# Patient Record
Sex: FEMALE | Race: WHITE | Employment: PART TIME | URBAN - METROPOLITAN AREA
[De-identification: names, ages, dates, MRNs, and addresses within clinical notes are randomized per-mention and may not be internally consistent; named-entity substitution may affect disease eponyms.]

---

## 2022-04-02 ENCOUNTER — APPOINTMENT (OUTPATIENT)
Dept: CT IMAGING | Age: 57
End: 2022-04-02
Payer: MEDICAID

## 2022-04-02 ENCOUNTER — HOSPITAL ENCOUNTER (OUTPATIENT)
Age: 57
Setting detail: OBSERVATION
Discharge: HOME HEALTH | End: 2022-04-03
Attending: EMERGENCY MEDICINE | Admitting: INTERNAL MEDICINE
Payer: MEDICAID

## 2022-04-02 ENCOUNTER — APPOINTMENT (OUTPATIENT)
Dept: GENERAL RADIOLOGY | Age: 57
End: 2022-04-02
Payer: MEDICAID

## 2022-04-02 DIAGNOSIS — Z79.4 TYPE 2 DIABETES MELLITUS WITHOUT COMPLICATION, WITH LONG-TERM CURRENT USE OF INSULIN (HCC): Primary | ICD-10-CM

## 2022-04-02 DIAGNOSIS — R51.9 NONINTRACTABLE HEADACHE, UNSPECIFIED CHRONICITY PATTERN, UNSPECIFIED HEADACHE TYPE: ICD-10-CM

## 2022-04-02 DIAGNOSIS — E16.2 HYPOGLYCEMIA: ICD-10-CM

## 2022-04-02 DIAGNOSIS — E11.9 TYPE 2 DIABETES MELLITUS WITHOUT COMPLICATION, WITH LONG-TERM CURRENT USE OF INSULIN (HCC): Primary | ICD-10-CM

## 2022-04-02 DIAGNOSIS — Z79.899 POLYPHARMACY: ICD-10-CM

## 2022-04-02 DIAGNOSIS — H53.8 BLURRY VISION, BILATERAL: ICD-10-CM

## 2022-04-02 PROBLEM — F33.41 RECURRENT MAJOR DEPRESSIVE DISORDER, IN PARTIAL REMISSION (HCC): Status: ACTIVE | Noted: 2021-07-12

## 2022-04-02 PROBLEM — E16.1 HYPOGLYCEMIA, EXOGENOUS HYPERINSULINEMIA: Status: ACTIVE | Noted: 2022-04-02

## 2022-04-02 PROBLEM — G47.00 INSOMNIA: Status: ACTIVE | Noted: 2021-07-12

## 2022-04-02 PROBLEM — K44.9 HIATAL HERNIA WITH GERD: Status: ACTIVE | Noted: 2019-07-14

## 2022-04-02 PROBLEM — E78.5 HYPERLIPIDEMIA LDL GOAL <70: Status: ACTIVE | Noted: 2021-07-12

## 2022-04-02 PROBLEM — K21.9 HIATAL HERNIA WITH GERD: Status: ACTIVE | Noted: 2019-07-14

## 2022-04-02 PROBLEM — I10 ESSENTIAL (PRIMARY) HYPERTENSION: Status: ACTIVE | Noted: 2021-04-23

## 2022-04-02 PROBLEM — E11.40 DIABETIC NEUROPATHY ASSOCIATED WITH TYPE 2 DIABETES MELLITUS (HCC): Status: ACTIVE | Noted: 2021-07-12

## 2022-04-02 LAB
ALBUMIN SERPL-MCNC: 4.2 G/DL (ref 3.5–5.2)
ALP BLD-CCNC: 84 U/L (ref 35–104)
ALT SERPL-CCNC: 16 U/L (ref 0–32)
ANION GAP SERPL CALCULATED.3IONS-SCNC: 10 MMOL/L (ref 7–16)
AST SERPL-CCNC: 12 U/L (ref 0–31)
BASOPHILS ABSOLUTE: 0.03 E9/L (ref 0–0.2)
BASOPHILS RELATIVE PERCENT: 0.5 % (ref 0–2)
BILIRUB SERPL-MCNC: 0.2 MG/DL (ref 0–1.2)
BILIRUBIN URINE: NEGATIVE
BLOOD, URINE: NEGATIVE
BUN BLDV-MCNC: 16 MG/DL (ref 6–20)
CALCIUM SERPL-MCNC: 9.5 MG/DL (ref 8.6–10.2)
CHLORIDE BLD-SCNC: 106 MMOL/L (ref 98–107)
CLARITY: CLEAR
CO2: 26 MMOL/L (ref 22–29)
COLOR: YELLOW
CREAT SERPL-MCNC: 0.6 MG/DL (ref 0.5–1)
D DIMER: 422 NG/ML DDU
EKG ATRIAL RATE: 65 BPM
EKG P AXIS: 40 DEGREES
EKG P-R INTERVAL: 124 MS
EKG Q-T INTERVAL: 386 MS
EKG QRS DURATION: 90 MS
EKG QTC CALCULATION (BAZETT): 401 MS
EKG R AXIS: 20 DEGREES
EKG T AXIS: 9 DEGREES
EKG VENTRICULAR RATE: 65 BPM
EOSINOPHILS ABSOLUTE: 0.07 E9/L (ref 0.05–0.5)
EOSINOPHILS RELATIVE PERCENT: 1.2 % (ref 0–6)
GFR AFRICAN AMERICAN: >60
GFR NON-AFRICAN AMERICAN: >60 ML/MIN/1.73
GLUCOSE BLD-MCNC: 102 MG/DL (ref 74–99)
GLUCOSE URINE: NEGATIVE MG/DL
HBA1C MFR BLD: 5.9 % (ref 4–5.6)
HCT VFR BLD CALC: 38.8 % (ref 34–48)
HEMOGLOBIN: 13.2 G/DL (ref 11.5–15.5)
IMMATURE GRANULOCYTES #: 0.02 E9/L
IMMATURE GRANULOCYTES %: 0.4 % (ref 0–5)
INR BLD: 1.1
KETONES, URINE: NEGATIVE MG/DL
LACTIC ACID: 0.7 MMOL/L (ref 0.5–2.2)
LEUKOCYTE ESTERASE, URINE: NEGATIVE
LIPASE: 41 U/L (ref 13–60)
LYMPHOCYTES ABSOLUTE: 1.98 E9/L (ref 1.5–4)
LYMPHOCYTES RELATIVE PERCENT: 35.1 % (ref 20–42)
MCH RBC QN AUTO: 31.8 PG (ref 26–35)
MCHC RBC AUTO-ENTMCNC: 34 % (ref 32–34.5)
MCV RBC AUTO: 93.5 FL (ref 80–99.9)
METER GLUCOSE: 118 MG/DL (ref 74–99)
METER GLUCOSE: 128 MG/DL (ref 74–99)
METER GLUCOSE: 196 MG/DL (ref 74–99)
METER GLUCOSE: 247 MG/DL (ref 74–99)
METER GLUCOSE: 49 MG/DL (ref 74–99)
METER GLUCOSE: 78 MG/DL (ref 74–99)
MONOCYTES ABSOLUTE: 0.35 E9/L (ref 0.1–0.95)
MONOCYTES RELATIVE PERCENT: 6.2 % (ref 2–12)
NEUTROPHILS ABSOLUTE: 3.19 E9/L (ref 1.8–7.3)
NEUTROPHILS RELATIVE PERCENT: 56.6 % (ref 43–80)
NITRITE, URINE: NEGATIVE
PDW BLD-RTO: 12.4 FL (ref 11.5–15)
PH UA: 5.5 (ref 5–9)
PLATELET # BLD: 201 E9/L (ref 130–450)
PMV BLD AUTO: 11.1 FL (ref 7–12)
POTASSIUM REFLEX MAGNESIUM: 3.9 MMOL/L (ref 3.5–5)
PROTEIN UA: NEGATIVE MG/DL
PROTHROMBIN TIME: 11.9 SEC (ref 9.3–12.4)
RBC # BLD: 4.15 E12/L (ref 3.5–5.5)
SEDIMENTATION RATE, ERYTHROCYTE: 23 MM/HR (ref 0–20)
SODIUM BLD-SCNC: 142 MMOL/L (ref 132–146)
SPECIFIC GRAVITY UA: 1.01 (ref 1–1.03)
TOTAL PROTEIN: 6.9 G/DL (ref 6.4–8.3)
TROPONIN, HIGH SENSITIVITY: 8 NG/L (ref 0–9)
TSH SERPL DL<=0.05 MIU/L-ACNC: 0.7 UIU/ML (ref 0.27–4.2)
UROBILINOGEN, URINE: 0.2 E.U./DL
WBC # BLD: 5.6 E9/L (ref 4.5–11.5)

## 2022-04-02 PROCEDURE — 93005 ELECTROCARDIOGRAM TRACING: CPT | Performed by: EMERGENCY MEDICINE

## 2022-04-02 PROCEDURE — 99285 EMERGENCY DEPT VISIT HI MDM: CPT

## 2022-04-02 PROCEDURE — 85025 COMPLETE CBC W/AUTO DIFF WBC: CPT

## 2022-04-02 PROCEDURE — 83036 HEMOGLOBIN GLYCOSYLATED A1C: CPT

## 2022-04-02 PROCEDURE — 85610 PROTHROMBIN TIME: CPT

## 2022-04-02 PROCEDURE — 82962 GLUCOSE BLOOD TEST: CPT

## 2022-04-02 PROCEDURE — 71275 CT ANGIOGRAPHY CHEST: CPT

## 2022-04-02 PROCEDURE — 85651 RBC SED RATE NONAUTOMATED: CPT

## 2022-04-02 PROCEDURE — 6370000000 HC RX 637 (ALT 250 FOR IP): Performed by: FAMILY MEDICINE

## 2022-04-02 PROCEDURE — 96372 THER/PROPH/DIAG INJ SC/IM: CPT

## 2022-04-02 PROCEDURE — 83605 ASSAY OF LACTIC ACID: CPT

## 2022-04-02 PROCEDURE — G0378 HOSPITAL OBSERVATION PER HR: HCPCS

## 2022-04-02 PROCEDURE — 81003 URINALYSIS AUTO W/O SCOPE: CPT

## 2022-04-02 PROCEDURE — 84443 ASSAY THYROID STIM HORMONE: CPT

## 2022-04-02 PROCEDURE — 6360000002 HC RX W HCPCS: Performed by: FAMILY MEDICINE

## 2022-04-02 PROCEDURE — 84484 ASSAY OF TROPONIN QUANT: CPT

## 2022-04-02 PROCEDURE — 85378 FIBRIN DEGRADE SEMIQUANT: CPT

## 2022-04-02 PROCEDURE — 6360000004 HC RX CONTRAST MEDICATION: Performed by: RADIOLOGY

## 2022-04-02 PROCEDURE — 83690 ASSAY OF LIPASE: CPT

## 2022-04-02 PROCEDURE — 71045 X-RAY EXAM CHEST 1 VIEW: CPT

## 2022-04-02 PROCEDURE — 2580000003 HC RX 258: Performed by: FAMILY MEDICINE

## 2022-04-02 PROCEDURE — 70450 CT HEAD/BRAIN W/O DYE: CPT

## 2022-04-02 PROCEDURE — 99219 PR INITIAL OBSERVATION CARE/DAY 50 MINUTES: CPT | Performed by: FAMILY MEDICINE

## 2022-04-02 PROCEDURE — 80053 COMPREHEN METABOLIC PANEL: CPT

## 2022-04-02 PROCEDURE — 93010 ELECTROCARDIOGRAM REPORT: CPT | Performed by: INTERNAL MEDICINE

## 2022-04-02 RX ORDER — GABAPENTIN 300 MG/1
300 CAPSULE ORAL 3 TIMES DAILY
Status: DISCONTINUED | OUTPATIENT
Start: 2022-04-02 | End: 2022-04-03 | Stop reason: HOSPADM

## 2022-04-02 RX ORDER — ATORVASTATIN CALCIUM 20 MG/1
20 TABLET, FILM COATED ORAL DAILY
COMMUNITY

## 2022-04-02 RX ORDER — BLOOD SUGAR DIAGNOSTIC
STRIP MISCELLANEOUS
COMMUNITY
End: 2022-04-02 | Stop reason: SDUPTHER

## 2022-04-02 RX ORDER — CHLORAL HYDRATE 500 MG
1000 CAPSULE ORAL 2 TIMES DAILY
COMMUNITY

## 2022-04-02 RX ORDER — GABAPENTIN 300 MG/1
300 CAPSULE ORAL 3 TIMES DAILY
COMMUNITY
End: 2022-05-12 | Stop reason: SDUPTHER

## 2022-04-02 RX ORDER — LAMOTRIGINE 150 MG/1
150 TABLET ORAL DAILY
COMMUNITY
End: 2022-05-12 | Stop reason: SDUPTHER

## 2022-04-02 RX ORDER — ZOLPIDEM TARTRATE 10 MG/1
10 TABLET ORAL EVERY EVENING
COMMUNITY

## 2022-04-02 RX ORDER — ONDANSETRON 4 MG/1
4 TABLET, ORALLY DISINTEGRATING ORAL EVERY 8 HOURS PRN
Status: DISCONTINUED | OUTPATIENT
Start: 2022-04-02 | End: 2022-04-03 | Stop reason: HOSPADM

## 2022-04-02 RX ORDER — SODIUM CHLORIDE 0.9 % (FLUSH) 0.9 %
5-40 SYRINGE (ML) INJECTION EVERY 12 HOURS SCHEDULED
Status: DISCONTINUED | OUTPATIENT
Start: 2022-04-02 | End: 2022-04-03 | Stop reason: HOSPADM

## 2022-04-02 RX ORDER — BENZONATATE 100 MG/1
100 CAPSULE ORAL 3 TIMES DAILY PRN
COMMUNITY

## 2022-04-02 RX ORDER — DEXTROSE AND SODIUM CHLORIDE 5; .45 G/100ML; G/100ML
INJECTION, SOLUTION INTRAVENOUS CONTINUOUS
Status: DISCONTINUED | OUTPATIENT
Start: 2022-04-02 | End: 2022-04-03

## 2022-04-02 RX ORDER — INSULIN ASPART 100 [IU]/ML
20 INJECTION, SUSPENSION SUBCUTANEOUS EVERY MORNING
COMMUNITY
End: 2022-04-02

## 2022-04-02 RX ORDER — SODIUM CHLORIDE 0.9 % (FLUSH) 0.9 %
5-40 SYRINGE (ML) INJECTION PRN
Status: DISCONTINUED | OUTPATIENT
Start: 2022-04-02 | End: 2022-04-03 | Stop reason: HOSPADM

## 2022-04-02 RX ORDER — DEXTROSE MONOHYDRATE 50 MG/ML
100 INJECTION, SOLUTION INTRAVENOUS PRN
Status: DISCONTINUED | OUTPATIENT
Start: 2022-04-02 | End: 2022-04-03 | Stop reason: HOSPADM

## 2022-04-02 RX ORDER — IBUPROFEN 800 MG/1
800 TABLET ORAL EVERY 8 HOURS PRN
COMMUNITY

## 2022-04-02 RX ORDER — ACETAMINOPHEN 325 MG/1
650 TABLET ORAL EVERY 6 HOURS PRN
Status: DISCONTINUED | OUTPATIENT
Start: 2022-04-02 | End: 2022-04-03 | Stop reason: HOSPADM

## 2022-04-02 RX ORDER — INSULIN ASPART 100 [IU]/ML
15 INJECTION, SUSPENSION SUBCUTANEOUS
COMMUNITY
End: 2022-04-02

## 2022-04-02 RX ORDER — IBUPROFEN 600 MG/1
1 TABLET ORAL PRN
Qty: 1 KIT | Refills: 2 | Status: SHIPPED | OUTPATIENT
Start: 2022-04-02

## 2022-04-02 RX ORDER — FLUTICASONE PROPIONATE 50 MCG
1 SPRAY, SUSPENSION (ML) NASAL DAILY
COMMUNITY

## 2022-04-02 RX ORDER — BENZONATATE 100 MG/1
100 CAPSULE ORAL 3 TIMES DAILY PRN
Status: DISCONTINUED | OUTPATIENT
Start: 2022-04-02 | End: 2022-04-03 | Stop reason: HOSPADM

## 2022-04-02 RX ORDER — ESCITALOPRAM OXALATE 10 MG/1
10 TABLET ORAL DAILY
Status: DISCONTINUED | OUTPATIENT
Start: 2022-04-02 | End: 2022-04-03 | Stop reason: HOSPADM

## 2022-04-02 RX ORDER — POLYETHYLENE GLYCOL 3350 17 G/17G
17 POWDER, FOR SOLUTION ORAL DAILY PRN
Status: DISCONTINUED | OUTPATIENT
Start: 2022-04-02 | End: 2022-04-03 | Stop reason: HOSPADM

## 2022-04-02 RX ORDER — LISINOPRIL 5 MG/1
5 TABLET ORAL DAILY
Status: DISCONTINUED | OUTPATIENT
Start: 2022-04-02 | End: 2022-04-03 | Stop reason: HOSPADM

## 2022-04-02 RX ORDER — BLOOD SUGAR DIAGNOSTIC
STRIP MISCELLANEOUS
Qty: 120 EACH | Refills: 0 | Status: SHIPPED | OUTPATIENT
Start: 2022-04-02

## 2022-04-02 RX ORDER — LISINOPRIL 5 MG/1
5 TABLET ORAL DAILY
COMMUNITY

## 2022-04-02 RX ORDER — INSULIN ASPART 100 [IU]/ML
4 INJECTION, SOLUTION INTRAVENOUS; SUBCUTANEOUS
Qty: 5 PEN | Refills: 3 | Status: SHIPPED | OUTPATIENT
Start: 2022-04-02 | End: 2022-05-12 | Stop reason: SDUPTHER

## 2022-04-02 RX ORDER — SODIUM CHLORIDE 9 MG/ML
INJECTION, SOLUTION INTRAVENOUS PRN
Status: DISCONTINUED | OUTPATIENT
Start: 2022-04-02 | End: 2022-04-03 | Stop reason: HOSPADM

## 2022-04-02 RX ORDER — ACETAMINOPHEN 650 MG/1
650 SUPPOSITORY RECTAL EVERY 6 HOURS PRN
Status: DISCONTINUED | OUTPATIENT
Start: 2022-04-02 | End: 2022-04-03 | Stop reason: HOSPADM

## 2022-04-02 RX ORDER — NICOTINE POLACRILEX 4 MG
15 LOZENGE BUCCAL PRN
Status: DISCONTINUED | OUTPATIENT
Start: 2022-04-02 | End: 2022-04-03 | Stop reason: HOSPADM

## 2022-04-02 RX ORDER — AMLODIPINE BESYLATE 10 MG/1
10 TABLET ORAL DAILY
COMMUNITY
End: 2022-05-12 | Stop reason: SDUPTHER

## 2022-04-02 RX ORDER — PANTOPRAZOLE SODIUM 40 MG/1
40 TABLET, DELAYED RELEASE ORAL DAILY
Status: DISCONTINUED | OUTPATIENT
Start: 2022-04-02 | End: 2022-04-03 | Stop reason: HOSPADM

## 2022-04-02 RX ORDER — AMLODIPINE BESYLATE 10 MG/1
10 TABLET ORAL DAILY
Status: DISCONTINUED | OUTPATIENT
Start: 2022-04-02 | End: 2022-04-03 | Stop reason: HOSPADM

## 2022-04-02 RX ORDER — ESCITALOPRAM OXALATE 10 MG/1
10 TABLET ORAL DAILY
COMMUNITY
End: 2022-05-12 | Stop reason: SDUPTHER

## 2022-04-02 RX ORDER — CLONAZEPAM 0.5 MG/1
1 TABLET ORAL 2 TIMES DAILY
Status: DISCONTINUED | OUTPATIENT
Start: 2022-04-02 | End: 2022-04-03 | Stop reason: HOSPADM

## 2022-04-02 RX ORDER — PANTOPRAZOLE SODIUM 40 MG/1
40 TABLET, DELAYED RELEASE ORAL DAILY
COMMUNITY

## 2022-04-02 RX ORDER — ATORVASTATIN CALCIUM 20 MG/1
20 TABLET, FILM COATED ORAL DAILY
Status: DISCONTINUED | OUTPATIENT
Start: 2022-04-02 | End: 2022-04-03 | Stop reason: HOSPADM

## 2022-04-02 RX ORDER — INSULIN GLARGINE 100 [IU]/ML
50 INJECTION, SOLUTION SUBCUTANEOUS NIGHTLY
Qty: 5 PEN | Refills: 0 | Status: SHIPPED | OUTPATIENT
Start: 2022-04-02 | End: 2022-04-02 | Stop reason: SDUPTHER

## 2022-04-02 RX ORDER — ONDANSETRON 2 MG/ML
4 INJECTION INTRAMUSCULAR; INTRAVENOUS EVERY 6 HOURS PRN
Status: DISCONTINUED | OUTPATIENT
Start: 2022-04-02 | End: 2022-04-03 | Stop reason: HOSPADM

## 2022-04-02 RX ORDER — CYCLOBENZAPRINE HCL 10 MG
10 TABLET ORAL NIGHTLY PRN
COMMUNITY

## 2022-04-02 RX ORDER — BLOOD-GLUCOSE METER
KIT MISCELLANEOUS
Qty: 1 KIT | Refills: 0 | Status: SHIPPED | OUTPATIENT
Start: 2022-04-02

## 2022-04-02 RX ORDER — INSULIN GLARGINE 100 [IU]/ML
40 INJECTION, SOLUTION SUBCUTANEOUS 2 TIMES DAILY
COMMUNITY
End: 2022-04-02 | Stop reason: SDUPTHER

## 2022-04-02 RX ORDER — LAMOTRIGINE 100 MG/1
150 TABLET ORAL DAILY
Status: DISCONTINUED | OUTPATIENT
Start: 2022-04-02 | End: 2022-04-03 | Stop reason: HOSPADM

## 2022-04-02 RX ORDER — ZOLPIDEM TARTRATE 5 MG/1
10 TABLET ORAL EVERY EVENING
Status: DISCONTINUED | OUTPATIENT
Start: 2022-04-02 | End: 2022-04-03 | Stop reason: HOSPADM

## 2022-04-02 RX ORDER — DEXTROSE MONOHYDRATE 25 G/50ML
12.5 INJECTION, SOLUTION INTRAVENOUS PRN
Status: DISCONTINUED | OUTPATIENT
Start: 2022-04-02 | End: 2022-04-03 | Stop reason: HOSPADM

## 2022-04-02 RX ORDER — CLONAZEPAM 1 MG/1
1 TABLET ORAL 2 TIMES DAILY
COMMUNITY
End: 2022-05-12 | Stop reason: SDUPTHER

## 2022-04-02 RX ORDER — INSULIN GLARGINE 100 [IU]/ML
40 INJECTION, SOLUTION SUBCUTANEOUS NIGHTLY
Qty: 5 PEN | Refills: 0 | Status: SHIPPED | OUTPATIENT
Start: 2022-04-02 | End: 2022-05-12 | Stop reason: SDUPTHER

## 2022-04-02 RX ADMIN — CLONAZEPAM 1 MG: 0.5 TABLET ORAL at 19:11

## 2022-04-02 RX ADMIN — AMLODIPINE BESYLATE 10 MG: 10 TABLET ORAL at 19:11

## 2022-04-02 RX ADMIN — LAMOTRIGINE 150 MG: 100 TABLET ORAL at 21:36

## 2022-04-02 RX ADMIN — ATORVASTATIN CALCIUM 20 MG: 20 TABLET, FILM COATED ORAL at 19:11

## 2022-04-02 RX ADMIN — INSULIN LISPRO 2 UNITS: 100 INJECTION, SOLUTION INTRAVENOUS; SUBCUTANEOUS at 21:44

## 2022-04-02 RX ADMIN — GABAPENTIN 300 MG: 300 CAPSULE ORAL at 21:35

## 2022-04-02 RX ADMIN — PANTOPRAZOLE SODIUM 40 MG: 40 TABLET, DELAYED RELEASE ORAL at 19:11

## 2022-04-02 RX ADMIN — DEXTROSE AND SODIUM CHLORIDE: 5; 450 INJECTION, SOLUTION INTRAVENOUS at 20:34

## 2022-04-02 RX ADMIN — IOPAMIDOL 75 ML: 755 INJECTION, SOLUTION INTRAVENOUS at 14:41

## 2022-04-02 RX ADMIN — ESCITALOPRAM 10 MG: 10 TABLET, FILM COATED ORAL at 19:11

## 2022-04-02 RX ADMIN — ENOXAPARIN SODIUM 40 MG: 40 INJECTION SUBCUTANEOUS at 19:12

## 2022-04-02 RX ADMIN — LURASIDONE HYDROCHLORIDE 40 MG: 20 TABLET, FILM COATED ORAL at 21:35

## 2022-04-02 RX ADMIN — LISINOPRIL 5 MG: 5 TABLET ORAL at 19:11

## 2022-04-02 ASSESSMENT — ENCOUNTER SYMPTOMS
EYE PAIN: 1
RHINORRHEA: 0
COLOR CHANGE: 0
PHOTOPHOBIA: 0
VOMITING: 0
COUGH: 1
ABDOMINAL PAIN: 0
SHORTNESS OF BREATH: 1
NAUSEA: 1

## 2022-04-02 ASSESSMENT — PAIN SCALES - GENERAL: PAINLEVEL_OUTOF10: 0

## 2022-04-02 ASSESSMENT — VISUAL ACUITY
OD: 20/50
OS: 20/40

## 2022-04-02 NOTE — ED NOTES
Pt states that she has been having trouble with her bgm's running from the , she states that is causes her to have dizziness, blurred vision and feeling of fatigue, she states that she also has been having weakness, pt is alerttimes 3, resp easy, states that on exertion she does get short of breath, abdomen is soft with bowel sounds times 4, 0 edema to lower legs bilateral, pedal pulses plus 2 bilateral.      Swathi Steen, RN  04/02/22 [id number]

## 2022-04-02 NOTE — ED PROVIDER NOTES
Dotty Armstrong is a 61 y.o. female presenting to the ED for headaches, sob, dizzyness, weakness, cough, beginning 1 month ago. The complaint has been intermittent, moderate in severity, and worsened by nothing. 60 yo f who has history of DM2 on insulin, anxiety, panic attacks, htn, hyperlipidemia  notes right eyeball pressure, headaches, forehead headache intermittent sob, cough, and dizzyness. Pt notes she is on lantus 40 qam, and she is on insulin 70/30 20 u in am and 15 q pm and also takes waws puit on lantus 30 qhs but states she is having lows in the morning, she notes she is \"doing a sliding scale of lantus\". Pt notes she was seeing a nurse practicioner in [place] who admitted she didn't know what she was doing,. Pt notes in am blood sugar is 40-90./ pt notes is usually 300 after meals. Pt is also on lisinipril, amlodipine, lipitor and klonopin and ambien and protonix    Review of Systems:   Review of Systems   Constitutional: Negative for diaphoresis and fatigue. HENT: Negative for congestion and rhinorrhea. Eyes: Positive for pain. Negative for photophobia and visual disturbance. Respiratory: Positive for cough and shortness of breath. Cardiovascular: Negative for chest pain and leg swelling. Gastrointestinal: Positive for nausea. Negative for abdominal pain and vomiting. Endocrine: Negative for polyphagia and polyuria. Genitourinary: Negative for difficulty urinating and dyspareunia. Musculoskeletal: Negative for arthralgias and joint swelling. Skin: Negative for color change and rash. Allergic/Immunologic: Negative for food allergies and immunocompromised state. Neurological: Positive for headaches. Negative for seizures and numbness. Hematological: Negative for adenopathy. Does not bruise/bleed easily. Psychiatric/Behavioral: Negative for agitation.  The patient is nervous/anxious.                  --------------------------------------------- PAST HISTORY ---------------------------------------------  Past Medical History:  has a past medical history of Other hyperlipidemia, Primary hypertension, and Type 2 diabetes mellitus, with long-term current use of insulin ([facility] [address] Past Surgical History:  has no past surgical history on file. Social History:  reports that she has never smoked. She has never used smokeless tobacco. She reports that she does not drink alcohol and does not use drugs. Family History: family history is not on file. The patients home medications have been reviewed.     Allergies: Codeine and Penicillins    -------------------------------------------------- RESULTS -------------------------------------------------  All laboratory and radiology results have been personally reviewed by myself   LABS:  Results for orders placed or performed during the hospital encounter of 04/02/22   CBC with Auto Differential   Result Value Ref Range    WBC 5.6 4.5 - 11.5 E9/L    RBC 4.15 3.50 - 5.50 E12/L    Hemoglobin 13.2 11.5 - 15.5 g/dL    Hematocrit 38.8 34.0 - 48.0 %    MCV 93.5 80.0 - 99.9 fL    MCH 31.8 26.0 - 35.0 pg    MCHC 34.0 32.0 - 34.5 %    RDW 12.4 11.5 - 15.0 fL    Platelets 293 192 - 721 E9/L    MPV 11.1 7.0 - 12.0 fL    Neutrophils % 56.6 43.0 - 80.0 %    Immature Granulocytes % 0.4 0.0 - 5.0 %    Lymphocytes % 35.1 20.0 - 42.0 %    Monocytes % 6.2 2.0 - 12.0 %    Eosinophils % 1.2 0.0 - 6.0 %    Basophils % 0.5 0.0 - 2.0 %    Neutrophils Absolute 3.19 1.80 - 7.30 E9/L    Immature Granulocytes # 0.02 E9/L    Lymphocytes Absolute 1.98 1.50 - 4.00 E9/L    Monocytes Absolute 0.35 0.10 - 0.95 E9/L    Eosinophils Absolute 0.07 0.05 - 0.50 E9/L    Basophils Absolute 0.03 0.00 - 0.20 E9/L   Lactic Acid   Result Value Ref Range    Lactic Acid 0.7 0.5 - 2.2 mmol/L   Hemoglobin A1C   Result Value Ref Range    Hemoglobin A1C 5.9 (H) 4.0 - 5.6 %   Comprehensive Metabolic Panel w/ Reflex to MG   Result Value Ref Range    Sodium 142 132 - 146 mmol/L Potassium reflex Magnesium 3.9 3.5 - 5.0 mmol/L    Chloride 106 98 - 107 mmol/L    CO2 26 22 - 29 mmol/L    Anion Gap 10 7 - 16 mmol/L    Glucose 102 (H) 74 - 99 mg/dL    BUN 16 6 - 20 mg/dL    CREATININE 0.6 0.5 - 1.0 mg/dL    GFR Non-African American >60 >=60 mL/min/1.73    GFR African American >60     Calcium 9.5 8.6 - 10.2 mg/dL    Total Protein 6.9 6.4 - 8.3 g/dL    Albumin 4.2 3.5 - 5.2 g/dL    Total Bilirubin 0.2 0.0 - 1.2 mg/dL    Alkaline Phosphatase 84 35 - 104 U/L    ALT 16 0 - 32 U/L    AST 12 0 - 31 U/L   Lipase   Result Value Ref Range    Lipase 41 13 - 60 U/L   Troponin   Result Value Ref Range    Troponin, High Sensitivity 8 0 - 9 ng/L   Protime-INR   Result Value Ref Range    Protime 11.9 9.3 - 12.4 sec    INR 1.1    D-Dimer, Quantitative   Result Value Ref Range    D-Dimer, Quant 422 ng/mL DDU   TSH   Result Value Ref Range    TSH 0.699 0.270 - 4.200 uIU/mL   Urinalysis   Result Value Ref Range    Color, UA Yellow Straw/Yellow    Clarity, UA Clear Clear    Glucose, Ur Negative Negative mg/dL    Bilirubin Urine Negative Negative    Ketones, Urine Negative Negative mg/dL    Specific Gravity, UA 1.015 1.005 - 1.030    Blood, Urine Negative Negative    pH, UA 5.5 5.0 - 9.0    Protein, UA Negative Negative mg/dL    Urobilinogen, Urine 0.2 <2.0 E.U./dL    Nitrite, Urine Negative Negative    Leukocyte Esterase, Urine Negative Negative   Sedimentation Rate   Result Value Ref Range    Sed Rate 23 (H) 0 - 20 mm/Hr   CBC auto differential   Result Value Ref Range    WBC 5.1 4.5 - 11.5 E9/L    RBC 4.12 3.50 - 5.50 E12/L    Hemoglobin 12.6 11.5 - 15.5 g/dL    Hematocrit 38.5 34.0 - 48.0 %    MCV 93.4 80.0 - 99.9 fL    MCH 30.6 26.0 - 35.0 pg    MCHC 32.7 32.0 - 34.5 %    RDW 12.3 11.5 - 15.0 fL    Platelets 369 104 - 696 E9/L    MPV 11.0 7.0 - 12.0 fL    Neutrophils % 48.2 43.0 - 80.0 %    Immature Granulocytes % 0.2 0.0 - 5.0 %    Lymphocytes % 43.7 (H) 20.0 - 42.0 %    Monocytes % 5.5 2.0 - 12.0 % Eosinophils % 1.8 0.0 - 6.0 %    Basophils % 0.6 0.0 - 2.0 %    Neutrophils Absolute 2.44 1.80 - 7.30 E9/L    Immature Granulocytes # 0.01 E9/L    Lymphocytes Absolute 2.21 1.50 - 4.00 E9/L    Monocytes Absolute 0.28 0.10 - 0.95 E9/L    Eosinophils Absolute 0.09 0.05 - 0.50 E9/L    Basophils Absolute 0.03 0.00 - 0.20 E9/L   Comprehensive Metabolic Panel w/ Reflex to MG   Result Value Ref Range    Sodium 142 132 - 146 mmol/L    Potassium reflex Magnesium 4.3 3.5 - 5.0 mmol/L    Chloride 108 (H) 98 - 107 mmol/L    CO2 24 22 - 29 mmol/L    Anion Gap 10 7 - 16 mmol/L    Glucose 175 (H) 74 - 99 mg/dL    BUN 13 6 - 20 mg/dL    CREATININE 0.7 0.5 - 1.0 mg/dL    GFR Non-African American >60 >=60 mL/min/1.73    GFR African American >60     Calcium 8.9 8.6 - 10.2 mg/dL    Total Protein 6.5 6.4 - 8.3 g/dL    Albumin 3.8 3.5 - 5.2 g/dL    Total Bilirubin 0.2 0.0 - 1.2 mg/dL    Alkaline Phosphatase 79 35 - 104 U/L    ALT 14 0 - 32 U/L    AST 12 0 - 31 U/L   CK   Result Value Ref Range    Total CK 16 (L) 20 - 180 U/L   POCT Glucose   Result Value Ref Range    Meter Glucose 196 (H) 74 - 99 mg/dL   POCT Glucose   Result Value Ref Range    Meter Glucose 49 (L) 74 - 99 mg/dL   POCT Glucose   Result Value Ref Range    Meter Glucose 128 (H) 74 - 99 mg/dL   POCT Glucose   Result Value Ref Range    Meter Glucose 118 (H) 74 - 99 mg/dL   POCT Glucose   Result Value Ref Range    Meter Glucose 78 74 - 99 mg/dL   POCT Glucose   Result Value Ref Range    Meter Glucose 247 (H) 74 - 99 mg/dL   POCT Glucose   Result Value Ref Range    Meter Glucose 159 (H) 74 - 99 mg/dL   POCT Glucose   Result Value Ref Range    Meter Glucose 119 (H) 74 - 99 mg/dL   EKG 12 Lead   Result Value Ref Range    Ventricular Rate 65 BPM    Atrial Rate 65 BPM    P-R Interval 124 ms    QRS Duration 90 ms    Q-T Interval 386 ms    QTc Calculation (Bazett) 401 ms    P Axis 40 degrees    R Axis 20 degrees    T Axis 9 degrees       RADIOLOGY:  Interpreted by Radiologist.  CTA PULMONARY W CONTRAST   Final Result   1. There is no pulmonary embolus   2. There are no findings of pneumonia and there is no pleural effusion   3. Very small hiatal hernia. XR CHEST PORTABLE   Final Result   No acute cardiopulmonary disease. CT HEAD WO CONTRAST   Final Result   No acute intracranial abnormality.             ------------------------- NURSING NOTES AND VITALS REVIEWED ---------------------------   The nursing notes within the ED encounter and vital signs as below have been reviewed. BP (!) 154/83   Pulse 54   Temp 97.9 °F (36.6 °C) (Oral)   Resp 16   Ht 5' 6\" (1.676 m)   Wt 167 lb (75.8 kg)   SpO2 98%   BMI 26.95 kg/m²   Oxygen Saturation Interpretation: Normal      ---------------------------------------------------PHYSICAL EXAM--------------------------------------    Physical Exam  Vitals reviewed. Constitutional:       General: She is not in acute distress. Appearance: Normal appearance. She is not toxic-appearing. HENT:      Head: Normocephalic and atraumatic. Right Ear: External ear normal.      Left Ear: External ear normal.      Nose: Nose normal. No congestion. Mouth/Throat:      Mouth: Mucous membranes are moist.      Pharynx: Oropharynx is clear. No posterior oropharyngeal erythema. Eyes:      Extraocular Movements: Extraocular movements intact. Pupils: Pupils are equal, round, and reactive to light. Cardiovascular:      Rate and Rhythm: Normal rate and regular rhythm. Pulses: Normal pulses. Heart sounds: No murmur heard. Pulmonary:      Effort: Pulmonary effort is normal.      Breath sounds: No wheezing or rhonchi. Chest:      Chest wall: No tenderness. Abdominal:      General: Bowel sounds are normal.      Tenderness: There is no abdominal tenderness. There is no right CVA tenderness, left CVA tenderness or guarding. Musculoskeletal:         General: No swelling or deformity.       Cervical back: Normal range of disintegrating tablet 4 mg (has no administration in time range)     Or   ondansetron (ZOFRAN) injection 4 mg (has no administration in time range)   polyethylene glycol (GLYCOLAX) packet 17 g (has no administration in time range)   acetaminophen (TYLENOL) tablet 650 mg (650 mg Oral Given 4/3/22 0316)     Or   acetaminophen (TYLENOL) suppository 650 mg ( Rectal See Alternative 4/3/22 0316)   dextrose 5 % and 0.45 % sodium chloride infusion ( IntraVENous Rate/Dose Verify 4/3/22 0645)   insulin lispro (HUMALOG) injection vial 0-12 Units (0 Units SubCUTAneous Held 4/3/22 0730)   insulin lispro (HUMALOG) injection vial 0-6 Units (2 Units SubCUTAneous Given 4/2/22 2144)   lurasidone (LATUDA) tablet 40 mg (40 mg Oral Given 4/2/22 2135)   iopamidol (ISOVUE-370) 76 % injection 75 mL (75 mLs IntraVENous Given 4/2/22 1441)     EKG: This EKG is signed and interpreted by me. Time:1300  Rate: 65  Rhythm: Sinus  Interpretation: no acute changes  Comparison: no previous EKG available      ED COURSE:  ED Course as of 04/03/22 0811   Sat Apr 02, 2022   1749 Patient blood sugar is trending down. I have concerns given her consistent shifts in drops and highs of her blood sugar that she will be at risk for having a hypoglycemic coma at home or potentially even die given this my concerns that despite having appropriate medications prescribed to her that she will not have a clear understanding of how to utilize these appropriately and could benefit from admission and observation to evaluate and get her regiment squarely taken care of Case discussed with Dr. Vance Lopez who will admit the patient [CF]      ED Course User Index  [CF] Silvana Prajapati, DO       Medical Decision Making:   Called family medicine, Dr. Willem Lambert they will have the patient follow-up in their office this week. Patient also instructed to follow-up with diabetes education and endocrinology.   We discontinued her 70/30 insulin and changed her Lantus from twice a day to once 40u a day. She will take NovoLog with meals and was written a sliding scale. She will be given nursing education. Also give her prescription for glucagon pen. Will be given instructions on hypoglycemia treatment at home. We discussed warning signs symptoms for when to return patient verbalized understanding. I have reviewed my findings and recommendations with Dontrell Ventura and members of family present at the time of disposition. My findings/plan: The primary encounter diagnosis was Type 2 diabetes mellitus without complication, with long-term current use of insulin ([facility] Diagnoses of Hypoglycemia, Nonintractable headache, unspecified chronicity pattern, unspecified headache type, Blurry vision, bilateral, and Polypharmacy were also pertinent to this visit. Current Discharge Medication List      START taking these medications    Details   Glucagon, rDNA, (GLUCAGON EMERGENCY) 1 MG KIT Inject 1 mg as directed as needed (hypoglycemia)  Qty: 1 kit, Refills: 2      glucose monitoring (FREESTYLE FREEDOM) kit Dispense test strips, lancets and glucometer for 6x daily testing  Dispense quantity sufficient  Qty: 1 kit, Refills: 0    Associated Diagnoses: Type 2 diabetes mellitus without complication, with long-term current use of insulin (HCC)      insulin aspart (NOVOLOG FLEXPEN RELION) 100 UNIT/ML injection pen Inject 4 Units into the skin 3 times daily (before meals) Take 4 units TID with meals    For blood sugar sliding scale  150-200 Take 2 unit  200-250 Take 4 Units  250-300 Take 6Units  300-350 Take 8 units  350-450 Take 10 units  For >450 Call your doctor  Qty: 5 pen, Refills: 3                  UPDATE  Pt initially was going to be discharged, after persistent hypoglycemia w long acting insulin on board the decision was to admit the patient      Counseling:    The emergency provider has spoken with the patient and discussed todays results, in addition to providing specific details for the plan of care and counseling regarding the diagnosis and prognosis. Questions are answered at this time and they are agreeable with the plan.      --------------------------------- IMPRESSION AND DISPOSITION ---------------------------------    IMPRESSION  1. Type 2 diabetes mellitus without complication, with [facility]    2. Hypoglycemia    3. Nonintractable headache, unspecified chronicity pattern, unspecified headache type    4. Blurry vision, bilateral    5. Polypharmacy        DISPOSITION  Disposition: admit  Patient condition is fair      NOTE: This report was transcribed using voice recognition software.  Every effort was made to ensure accuracy; however, inadvertent computerized transcription errors may be present       Ramses Reynolds, DO  04/02/22 9581 [facility], DO  04/03/22 [id number]

## 2022-04-02 NOTE — H&P
[facility] History and Physical          ASSESSMENT:      Principal Problem:    Hypoglycemia, exogenous hyperinsulinemia  Active Problems:    Diabetes mellitus (HCC)    Recurrent major depressive disorder, in partial remission ([facility] [address]    Essential (primary) hypertension    Diabetic neuropathy associated with type 2 diabetes mellitus ([facility] [address]  Resolved Problems:    * No resolved hospital problems. *      PLAN:    1. Hypoglycemia: Most likely due to exogenous excess insulin. At this time we will start D5 half-normal saline at 75 cc/h with hypoglycemic protocol in place. Hold all home medications at this time. May stop infusion tomorrow morning. Sliding scale correction dose if needed. Consult Endo for home treatment adjustment. A1c today is 5.9.  CBC, CMP, and CK in the morning. C-peptide and troponin ordered tonight. 2.  Hypertension: Continue with current medical management. 3.  Major depressive disorder: Continue with home medications. 4.  Headache and leg pain: Mildly elevated D-dimer. CT of the head showed no acute abnormality. CTA showed no pulmonary emboli. Code Status: Full  DVT prophylaxis: Lovenox      CHIEF COMPLAINT: Blood sugar issues    History of Present Illness: Patient is a 56-year-old female with past medical history significant for type 2 diabetes on insulin therapy, anxiety, panic attacks, hypertension, hyperlipidemia. She presented to the emergency department for 1 month history of worsening headaches, right eye pain, dyspnea, dizziness, weakness, and mildly nonproductive cough. When questioned further the patient states that immediately prior to the symptoms beginning the nurse practitioner who is managing her medication increased her insulin dosing. To further clarify the patient was taking 40 units of Lantus and 20 units of 70/30 in the a.m. and 40 units of Lantus with 15 units of 70/30 every afternoon.   He had been on Metformin several years ago but is currently on no oral medications. She states that she contacted her provider who actually wanted to put her back on Metformin despite her current symptoms. In addition to the above-noted issues she is also having intermittent leg pain and right arm numbness/weakness which occurs with headache. Informant(s) for H&P: Patient    REVIEW OF SYSTEMS:  A comprehensive review of systems was negative except for: what is in the HPI      PMH:  Past Medical History:   Diagnosis Date    Other hyperlipidemia     Primary hypertension     Type 2 diabetes mellitus, with long-term current use of insulin ([facility] [address]        Surgical History:  No past surgical history on file. Medications Prior to Admission:    Prior to Admission medications    Medication Sig Start Date End Date Taking? Authorizing Provider   atorvastatin (LIPITOR) 20 MG tablet Take 20 mg by mouth daily   Yes Historical Provider, MD   benzonatate (TESSALON) 100 MG capsule Take 100 mg by mouth 3 times daily as needed for Cough   Yes Historical Provider, MD   metFORMIN (GLUCOPHAGE) 500 MG tablet Take 500 mg by mouth 2 times daily    Yes Historical Provider, MD   clonazePAM (KLONOPIN) 1 MG tablet Take 1 mg by mouth in the morning and at bedtime. Yes Historical Provider, MD   zolpidem (AMBIEN) 10 MG tablet Take 10 mg by mouth every evening.     Yes Historical Provider, MD   pantoprazole (PROTONIX) 40 MG tablet Take 40 mg by mouth daily   Yes Historical Provider, MD   cyclobenzaprine (FLEXERIL) 10 MG tablet Take 10 mg by mouth nightly as needed for Muscle spasms   Yes Historical Provider, MD   Omega-3 Fatty Acids (FISH OIL) 1000 MG CAPS Take 1,000 mg by mouth 2 times daily   Yes Historical Provider, MD   escitalopram (LEXAPRO) 10 MG tablet Take 10 mg by mouth daily   Yes Historical Provider, MD   fluticasone (FLONASE) 50 MCG/ACT nasal spray 1 spray by Each Nostril route daily   Yes Historical Provider, MD   ibuprofen (ADVIL;MOTRIN) 800 MG tablet Take 800 mg by mouth lb (75.8 kg)   SpO2 97%   BMI 26.95 kg/m²     General Appearance: alert and oriented to person, place and time and in no acute distress  Skin: warm and dry  Head: normocephalic and atraumatic  Eyes: pupils equal, round, and reactive to light, extraocular eye movements intact, conjunctivae normal  Neck: neck supple and non tender without mass   Pulmonary/Chest: clear to auscultation bilaterally- no wheezes, rales or rhonchi, normal air movement, no respiratory distress  Cardiovascular: normal rate, normal S1 and S2 and no carotid bruits  Abdomen: soft, non-tender, non-distended, normal bowel sounds, no masses or organomegaly  Extremities: no cyanosis, no clubbing and no edema  Neurologic: no cranial nerve deficit and speech normal        LABS:  Recent Labs     04/02/22  1307      K 3.9      CO2 26   BUN 16   CREATININE 0.6   GLUCOSE 102*   CALCIUM 9.5       Recent Labs     04/02/22  1307   WBC 5.6   RBC 4.15   HGB 13.2   HCT 38.8   MCV 93.5   MCH 31.8   MCHC 34.0   RDW 12.4      MPV 11.1       No results for input(s): POCGLU in the last 72 hours.     CBC with Differential:    Lab Results   Component Value Date    WBC 5.6 04/02/2022    RBC 4.15 04/02/2022    HGB 13.2 04/02/2022    HCT 38.8 04/02/2022     04/02/2022    MCV 93.5 04/02/2022    MCH 31.8 04/02/2022    MCHC 34.0 04/02/2022    RDW 12.4 04/02/2022    LYMPHOPCT 35.1 04/02/2022    MONOPCT 6.2 04/02/2022    BASOPCT 0.5 04/02/2022    MONOSABS 0.35 04/02/2022    LYMPHSABS 1.98 04/02/2022    EOSABS 0.07 04/02/2022    BASOSABS 0.03 04/02/2022     CMP:    Lab Results   Component Value Date     04/02/2022    K 3.9 04/02/2022     04/02/2022    CO2 26 04/02/2022    BUN 16 04/02/2022    CREATININE 0.6 04/02/2022    GFRAA >60 04/02/2022    LABGLOM >60 04/02/2022    GLUCOSE 102 04/02/2022    PROT 6.9 04/02/2022    LABALBU 4.2 04/02/2022    CALCIUM 9.5 04/02/2022    BILITOT 0.2 04/02/2022    ALKPHOS 84 04/02/2022    AST 12 04/02/2022 ALT 16 04/02/2022     Magnesium:  No results found for: MG  Phosphorus:  No results found for: PHOS    Radiology:   CTA PULMONARY W CONTRAST   Final Result   1. There is no pulmonary embolus   2. There are no findings of pneumonia and there is no pleural effusion   3. Very small hiatal hernia. XR CHEST PORTABLE   Final Result   No acute cardiopulmonary disease. CT HEAD WO CONTRAST   Final Result   No acute intracranial abnormality. EKG: Normal sinus rhythm with ventricular rate of 65 bpm, no acute ST segment or T wave changes noted. NOTE: This report was transcribed using voice recognition software. Every effort was made to ensure accuracy; however, inadvertent computerized transcription errors may be present.   Electronically signed by Volodymyr Melendez DO on 4/2/2022 at 6:48 PM

## 2022-04-02 NOTE — PROGRESS NOTES
Spoke with ED attending. Patient recently moved from [place]. She is looking for a PCP in the area. I will send information to our office staff and tried to get her scheduled. She will also be provided clinic information upon discharge.

## 2022-04-03 VITALS
OXYGEN SATURATION: 98 % | SYSTOLIC BLOOD PRESSURE: 112 MMHG | RESPIRATION RATE: 14 BRPM | WEIGHT: 167 LBS | HEART RATE: 62 BPM | TEMPERATURE: 98.1 F | BODY MASS INDEX: 26.84 KG/M2 | DIASTOLIC BLOOD PRESSURE: 63 MMHG | HEIGHT: 66 IN

## 2022-04-03 LAB
ALBUMIN SERPL-MCNC: 3.8 G/DL (ref 3.5–5.2)
ALP BLD-CCNC: 79 U/L (ref 35–104)
ALT SERPL-CCNC: 14 U/L (ref 0–32)
ANION GAP SERPL CALCULATED.3IONS-SCNC: 10 MMOL/L (ref 7–16)
AST SERPL-CCNC: 12 U/L (ref 0–31)
BASOPHILS ABSOLUTE: 0.03 E9/L (ref 0–0.2)
BASOPHILS RELATIVE PERCENT: 0.6 % (ref 0–2)
BILIRUB SERPL-MCNC: 0.2 MG/DL (ref 0–1.2)
BUN BLDV-MCNC: 13 MG/DL (ref 6–20)
CALCIUM SERPL-MCNC: 8.9 MG/DL (ref 8.6–10.2)
CHLORIDE BLD-SCNC: 108 MMOL/L (ref 98–107)
CO2: 24 MMOL/L (ref 22–29)
CREAT SERPL-MCNC: 0.7 MG/DL (ref 0.5–1)
EOSINOPHILS ABSOLUTE: 0.09 E9/L (ref 0.05–0.5)
EOSINOPHILS RELATIVE PERCENT: 1.8 % (ref 0–6)
GFR AFRICAN AMERICAN: >60
GFR NON-AFRICAN AMERICAN: >60 ML/MIN/1.73
GLUCOSE BLD-MCNC: 175 MG/DL (ref 74–99)
HCT VFR BLD CALC: 38.5 % (ref 34–48)
HEMOGLOBIN: 12.6 G/DL (ref 11.5–15.5)
IMMATURE GRANULOCYTES #: 0.01 E9/L
IMMATURE GRANULOCYTES %: 0.2 % (ref 0–5)
LYMPHOCYTES ABSOLUTE: 2.21 E9/L (ref 1.5–4)
LYMPHOCYTES RELATIVE PERCENT: 43.7 % (ref 20–42)
MCH RBC QN AUTO: 30.6 PG (ref 26–35)
MCHC RBC AUTO-ENTMCNC: 32.7 % (ref 32–34.5)
MCV RBC AUTO: 93.4 FL (ref 80–99.9)
METER GLUCOSE: 119 MG/DL (ref 74–99)
METER GLUCOSE: 159 MG/DL (ref 74–99)
METER GLUCOSE: 167 MG/DL (ref 74–99)
METER GLUCOSE: 194 MG/DL (ref 74–99)
MONOCYTES ABSOLUTE: 0.28 E9/L (ref 0.1–0.95)
MONOCYTES RELATIVE PERCENT: 5.5 % (ref 2–12)
NEUTROPHILS ABSOLUTE: 2.44 E9/L (ref 1.8–7.3)
NEUTROPHILS RELATIVE PERCENT: 48.2 % (ref 43–80)
PDW BLD-RTO: 12.3 FL (ref 11.5–15)
PLATELET # BLD: 197 E9/L (ref 130–450)
PMV BLD AUTO: 11 FL (ref 7–12)
POTASSIUM REFLEX MAGNESIUM: 4.3 MMOL/L (ref 3.5–5)
RBC # BLD: 4.12 E12/L (ref 3.5–5.5)
SODIUM BLD-SCNC: 142 MMOL/L (ref 132–146)
TOTAL CK: 16 U/L (ref 20–180)
TOTAL PROTEIN: 6.5 G/DL (ref 6.4–8.3)
WBC # BLD: 5.1 E9/L (ref 4.5–11.5)

## 2022-04-03 PROCEDURE — 36415 COLL VENOUS BLD VENIPUNCTURE: CPT

## 2022-04-03 PROCEDURE — 80053 COMPREHEN METABOLIC PANEL: CPT

## 2022-04-03 PROCEDURE — APPSS30 APP SPLIT SHARED TIME 16-30 MINUTES: Performed by: NURSE PRACTITIONER

## 2022-04-03 PROCEDURE — 6370000000 HC RX 637 (ALT 250 FOR IP): Performed by: FAMILY MEDICINE

## 2022-04-03 PROCEDURE — 99225 PR SBSQ OBSERVATION CARE/DAY 25 MINUTES: CPT | Performed by: INTERNAL MEDICINE

## 2022-04-03 PROCEDURE — 82550 ASSAY OF CK (CPK): CPT

## 2022-04-03 PROCEDURE — 99254 IP/OBS CNSLTJ NEW/EST MOD 60: CPT | Performed by: INTERNAL MEDICINE

## 2022-04-03 PROCEDURE — 82962 GLUCOSE BLOOD TEST: CPT

## 2022-04-03 PROCEDURE — 84681 ASSAY OF C-PEPTIDE: CPT

## 2022-04-03 PROCEDURE — 96372 THER/PROPH/DIAG INJ SC/IM: CPT

## 2022-04-03 PROCEDURE — 6360000002 HC RX W HCPCS: Performed by: FAMILY MEDICINE

## 2022-04-03 PROCEDURE — 2580000003 HC RX 258: Performed by: FAMILY MEDICINE

## 2022-04-03 PROCEDURE — G0378 HOSPITAL OBSERVATION PER HR: HCPCS

## 2022-04-03 PROCEDURE — 85025 COMPLETE CBC W/AUTO DIFF WBC: CPT

## 2022-04-03 RX ADMIN — LURASIDONE HYDROCHLORIDE 40 MG: 20 TABLET, FILM COATED ORAL at 08:22

## 2022-04-03 RX ADMIN — CLONAZEPAM 1 MG: 0.5 TABLET ORAL at 14:57

## 2022-04-03 RX ADMIN — GABAPENTIN 300 MG: 300 CAPSULE ORAL at 13:53

## 2022-04-03 RX ADMIN — INSULIN LISPRO 2 UNITS: 100 INJECTION, SOLUTION INTRAVENOUS; SUBCUTANEOUS at 11:26

## 2022-04-03 RX ADMIN — ATORVASTATIN CALCIUM 20 MG: 20 TABLET, FILM COATED ORAL at 08:22

## 2022-04-03 RX ADMIN — ENOXAPARIN SODIUM 40 MG: 40 INJECTION SUBCUTANEOUS at 08:22

## 2022-04-03 RX ADMIN — AMLODIPINE BESYLATE 10 MG: 10 TABLET ORAL at 08:22

## 2022-04-03 RX ADMIN — LAMOTRIGINE 150 MG: 100 TABLET ORAL at 08:21

## 2022-04-03 RX ADMIN — INSULIN LISPRO 2 UNITS: 100 INJECTION, SOLUTION INTRAVENOUS; SUBCUTANEOUS at 16:17

## 2022-04-03 RX ADMIN — SODIUM CHLORIDE, PRESERVATIVE FREE 10 ML: 5 INJECTION INTRAVENOUS at 08:23

## 2022-04-03 RX ADMIN — PANTOPRAZOLE SODIUM 40 MG: 40 TABLET, DELAYED RELEASE ORAL at 08:22

## 2022-04-03 RX ADMIN — ACETAMINOPHEN 650 MG: 325 TABLET ORAL at 03:16

## 2022-04-03 RX ADMIN — GABAPENTIN 300 MG: 300 CAPSULE ORAL at 08:22

## 2022-04-03 RX ADMIN — LISINOPRIL 5 MG: 5 TABLET ORAL at 08:22

## 2022-04-03 RX ADMIN — ESCITALOPRAM 10 MG: 10 TABLET, FILM COATED ORAL at 08:23

## 2022-04-03 ASSESSMENT — PAIN DESCRIPTION - ORIENTATION: ORIENTATION: MID;ANTERIOR

## 2022-04-03 ASSESSMENT — PAIN SCALES - GENERAL
PAINLEVEL_OUTOF10: 0
PAINLEVEL_OUTOF10: 2
PAINLEVEL_OUTOF10: 4
PAINLEVEL_OUTOF10: 3

## 2022-04-03 ASSESSMENT — PAIN DESCRIPTION - ONSET
ONSET: GRADUAL
ONSET: GRADUAL

## 2022-04-03 ASSESSMENT — PAIN DESCRIPTION - DESCRIPTORS
DESCRIPTORS: HEADACHE;ACHING;THROBBING
DESCRIPTORS: HEADACHE

## 2022-04-03 ASSESSMENT — PAIN DESCRIPTION - LOCATION
LOCATION: HEAD
LOCATION: HEAD

## 2022-04-03 ASSESSMENT — PAIN - FUNCTIONAL ASSESSMENT: PAIN_FUNCTIONAL_ASSESSMENT: ACTIVITIES ARE NOT PREVENTED

## 2022-04-03 ASSESSMENT — PAIN DESCRIPTION - FREQUENCY
FREQUENCY: CONTINUOUS
FREQUENCY: CONTINUOUS

## 2022-04-03 ASSESSMENT — PAIN DESCRIPTION - PROGRESSION
CLINICAL_PROGRESSION: GRADUALLY WORSENING
CLINICAL_PROGRESSION: GRADUALLY WORSENING

## 2022-04-03 ASSESSMENT — PAIN DESCRIPTION - PAIN TYPE: TYPE: ACUTE PAIN

## 2022-04-03 NOTE — PLAN OF CARE
Problem: Anxiety:  Goal: Level of anxiety will decrease  Description: Level of anxiety will decrease  Outcome: Met This Shift     Problem: Sensory:  Goal: General experience of comfort will improve  Description: General experience of comfort will improve  Outcome: Met This Shift     Problem: Health Behavior:  Goal: Ability to identify and utilize available resources and services will improve  Description: Ability to identify and utilize available resources and services will improve  Outcome: Met This Shift     Problem: Metabolic:  Goal: Ability to maintain appropriate glucose levels will improve  Description: Ability to maintain appropriate glucose levels will improve  Outcome: Met This Shift     Problem: Tissue Perfusion:  Goal: Adequacy of tissue perfusion will improve  Description: Adequacy of tissue perfusion will improve  Outcome: Met This Shift

## 2022-04-03 NOTE — CONSULTS
ENDOCRINOLOGY INITIAL CONSULTATION NOTE      Date of admission: 4/2/2022  Date of service: 4/3/2022  Admitting physician: Nicole Ayala DO   Primary Care Physician: No primary care provider on file. Consultant physician: Krysten Bernard MD     Reason for the consultation:  Uncontrolled DM    History of Present Illness: The history is provided by the patient. Accuracy of the patient data is excellent    Shalom Solares is a very pleasant 61 y.o. old female with PMH DM type 2 on insulin, anxiety, HLD and other listed below admitted to [facility] on 4/2/2022 because of dizziness and found to be hypoglycemic, endocrine service was consulted for diabetes management. The opateint was in her usual state of health until about a months ago when started c/o dizziness and lightheadedness and blurry vision. She reported frequeny hypoglycemia at home. Up on arrival to ER, BG was   Admission labs BG 49, AG 10, Bicrb 26, Cr 0.6   \  Prior to admission  The patient was diagnosed with type 2 DM long time ago. Prior to admission patient was on Lantus 40u BID, Novolog Mix 70/30 20u am, and 15u at bedtime . Patient reports multiple  hypoglycemic episodes at home. Patient has not been eating consistent carbohydrate meals, self-blood glucose monitoring has been variable prior to admission.  In addition, patient reports diabetic retinopathy   Lab Results   Component Value Date    LABA1C 5.9 04/02/2022       Inpatient diet:   Carb Restricted diet     Point of care glucose monitoring   (Independently reviewed)   Recent Labs     04/02/22  1543 04/02/22  1630 04/02/22  1743 04/02/22  1906 04/02/22  2133 04/03/22  0311 04/03/22  0616 04/03/22  1118   GLUMET 49* 128* 118* 66 247* 159* 119* 167*       Past medical history:   Past Medical History:   Diagnosis Date    Other hyperlipidemia     Primary hypertension     Type 2 diabetes mellitus, with long-term current use of insulin (HCC)        Past surgical history:  No past surgical history on file.    Social history:   Tobacco:   reports that she has never smoked. She has never used smokeless tobacco.  Alcohol:   reports no history of alcohol use. Drugs:   reports no history of drug use. Family history:    No family history on file. Allergy and drug reactions: Allergies   Allergen Reactions    Codeine Shortness Of Breath    Penicillins Shortness Of Breath       Scheduled Meds:   amLODIPine  10 mg Oral Daily    atorvastatin  20 mg Oral Daily    clonazePAM  1 mg Oral BID    escitalopram  10 mg Oral Daily    gabapentin  300 mg Oral TID    lamoTRIgine  150 mg Oral Daily    lisinopril  5 mg Oral Daily    pantoprazole  40 mg Oral Daily    zolpidem  10 mg Oral QPM    sodium chloride flush  5-40 mL IntraVENous 2 times per day    enoxaparin  40 mg SubCUTAneous Daily    insulin lispro  0-12 Units SubCUTAneous TID WC    insulin lispro  0-6 Units SubCUTAneous Nightly    lurasidone  40 mg Oral Daily       PRN Meds:   glucose, 15 g, PRN  dextrose, 12.5 g, PRN  glucagon (rDNA), 1 mg, PRN  dextrose, 100 mL/hr, PRN  benzonatate, 100 mg, TID PRN  sodium chloride flush, 5-40 mL, PRN  sodium chloride, , PRN  ondansetron, 4 mg, Q8H PRN   Or  ondansetron, 4 mg, Q6H PRN  polyethylene glycol, 17 g, Daily PRN  acetaminophen, 650 mg, Q6H PRN   Or  acetaminophen, 650 mg, Q6H PRN      Continuous Infusions:   dextrose      sodium chloride         Review of Systems  All systems reviewed.  All negative except for symptoms mentioned in HPI     OBJECTIVE    /63   Pulse 62   Temp 98.1 °F (36.7 °C) (Oral)   Resp 14   Ht 5' 6\" (1.676 m)   Wt 167 lb (75.8 kg)   SpO2 98%   BMI 26.95 kg/m²     Intake/Output Summary (Last 24 hours) at 4/3/2022 1457  Last data filed at 4/3/2022 0645  Gross per 24 hour   Intake 740.1 ml   Output --   Net 740.1 ml       Physical examination:  General: awake alert, oriented x3  HEENT: normocephalic non traumatic, no exophthalmos   Neck: supple, No thyroid tenderness,  Pulm: good equal air entry no added sounds  CVS: S1 + S2  Abd: soft lax, no tenderness  Skin: warm, no lesions, no rash. No open wounds, no ulcers   Neuro: CN intact, sensation decreased bilateral , muscle power normal  Psych: normal mood, and affect    Review of Laboratory Data:  I personally reviewed the following labs:   Recent Labs     04/02/22  1307 04/03/22  0000   WBC 5.6 5.1   RBC 4.15 4.12   HGB 13.2 12.6   HCT 38.8 38.5   MCV 93.5 93.4   MCH 31.8 30.6   MCHC 34.0 32.7   RDW 12.4 12.3    197   MPV 11.1 11.0     Recent Labs     04/02/22  1307 04/03/22  0000    142   K 3.9 4.3    108*   CO2 26 24   BUN 16 13   CREATININE 0.6 0.7   GLUCOSE 102* 175*   CALCIUM 9.5 8.9   PROT 6.9 6.5   LABALBU 4.2 3.8   BILITOT 0.2 0.2   ALKPHOS 84 79   AST 12 12   ALT 16 14     No results found for: BHYDRXBUT  Lab Results   Component Value Date    LABA1C 5.9 04/02/2022     Lab Results   Component Value Date/Time    TSH 0.699 04/02/2022 01:07 PM     Lab Results   Component Value Date    LABA1C 5.9 04/02/2022    GLUCOSE 175 04/03/2022     No results found for: TRIG, HDL, LDLCALC, CHOL    Blood culture   No results found for: [facility]    Radiology:  CTA PULMONARY W CONTRAST   Final Result   1. There is no pulmonary embolus   2. There are no findings of pneumonia and there is no pleural effusion   3. Very small hiatal hernia. XR CHEST PORTABLE   Final Result   No acute cardiopulmonary disease. CT HEAD WO CONTRAST   Final Result   No acute intracranial abnormality.              Medical Records/Labs/Images review:   I personally reviewed and summarized previous records   All labs and imaging were reviewed independently     [address] Ajit Bowers, a 61 y.o.-old female seen today for inpatient diabetes management     Diabetes Mellitus type 2  · A1c 5.9% with frequent hypoglycemia   · Pt was on both Lantus and 70/30 insulin at home (!!!)   · will change diabetes regimen to:  · Lantus 12u nightly · Humalog 4u with meals   · Low dose sliding scale   · Continue glucose check with meals and at bedtime   · Will titrate insulin dose based on the blood glucose trend & insulin requirement  · Pt will follow with us after discharge. Endocrine follow up visit, Wednesday 4/6 at 3:00pm     Hypoglycemia   · Pt was on both lantus and 70/30 insulin at home  · Adjust insulin regimen as per above   · Pt will follow with us after discharge     Blurry vision   · H/o diabetic retinopathy. Advised to get an eye exam soon after discharge     The above issues were reviewed with the patient who understood and agreed with the plan. Thank you for allowing us to participate in the care of this patient. Please do not hesitate to contact us with any additional questions. Bk Valdes MD  Endocrinologist, [place] [address] [facility]   [address] [place] [address]   Phone: [phone]  Fax: [phone]  --------------------------------------------  An electronic signature was used to authenticate this note.  Naomi Flores MD on 4/3/2022 at 2:57 PM

## 2022-04-03 NOTE — DISCHARGE SUMMARY
[facility] Physician Discharge Summary     [place]  [address]  [facility]     Schedule an appointment as soon as possible for a visit   [address], [address] [facility]  [phone]    Schedule an appointment as soon as possible for a visit       [facility]) -- Diabetes Education Services, [address] [facility] [place] [address]  Get Directions  Tel: [phone]  Schedule an appointment as soon as possible for a visit in 1 week      Ryder Mckenzie MD  [address] [facility] [phone]    Schedule an appointment as soon as possible for a visit in 1 day  eye doctor    Activity level   As tolerated    Disposition   Home      Condition on discharge Stable    Patient ID   Jenise Spears, 61 y. o.female /  1965  / MRN [id number]    Admit date   2022    Discharge date  4/3/2022  4:18 PM    Admission diagnoses Principal Problem:    Hypoglycemia, exogenous hyperinsulinemia  Active Problems:    Diabetes mellitus (HCC)    Recurrent major depressive disorder, in partial remission ([facility]    Essential (primary) hypertension    Diabetic neuropathy associated with type 2 diabetes mellitus ([facility]  Resolved Problems:    * No resolved hospital problems. *    Discharge diagnoses Same    Consults   IP CONSULT TO FAMILY MEDICINE  IP CONSULT TO ENDOCRINOLOGY  IP CONSULT TO DIABETES EDUCATOR    Procedures   See hospital course    Hospital Course  56F PMH IDDM / HTN / HPL admitted last evening with hypoglycemia following adjustment of her outpatient insulin regimen. Per her she was on both Lantus as well as 7030 as well as metformin, this combination had driven down her blood sugar to the point where she became symptomatic with headache, dizziness, weakness, some numbness, blurred vision.   Here, patient was provided with dextrose containing fluids. Endocrinology was consulted for significant adjustments of her insulin regimen. She is now with more normal blood sugars, symptoms have resolved apart from some blurry vision still. Insulin regimen has been adjusted by endocrinology, scripts have been printed out on her side, and the patient has a follow-up appointment with him in the near future. I feel this point she is stable for discharge home. Discharge Exam  /63   Pulse 62   Temp 98.1 °F (36.7 °C) (Oral)   Resp 14   Ht 5' 6\" (1.676 m)   Wt 167 lb (75.8 kg)   SpO2 98%   BMI 26.95 kg/m²   General Appearance: alert and oriented to person, place and time and in no acute distress  Skin: warm and dry  Head: normocephalic and atraumatic  Eyes: pupils equal, round, and reactive to light, extraocular eye movements intact, conjunctivae normal  Neck: neck supple and non tender without mass   Pulmonary/Chest: clear to auscultation bilaterally- no wheezes, rales or rhonchi, normal air movement, no respiratory distress  Cardiovascular: normal rate, normal S1 and S2 and no carotid bruits  Abdomen: soft, non-tender, non-distended, normal bowel sounds, no masses or organomegaly  Extremities: no cyanosis, no clubbing and no edema  Neurologic: no cranial nerve deficit and speech normal    I/O last 3 completed shifts: In: 740.1 [I.V.:740.1]  Out: -   No intake/output data recorded. Labs  Recent Labs     04/02/22  1307 04/03/22  0000    142   K 3.9 4.3    108*   CO2 26 24   BUN 16 13   CREATININE 0.6 0.7   GLUCOSE 102* 175*   CALCIUM 9.5 8.9   WBC 5.6 5.1   RBC 4.15 4.12   HGB 13.2 12.6   HCT 38.8 38.5   MCV 93.5 93.4   MCH 31.8 30.6   MCHC 34.0 32.7   RDW 12.4 12.3    197   MPV 11.1 11.0       Imaging  CT HEAD WO CONTRAST    Result Date: 4/2/2022  No acute intracranial abnormality. XR CHEST PORTABLE    Result Date: 4/2/2022  No acute cardiopulmonary disease. CTA PULMONARY W CONTRAST    Result Date: 4/2/2022  1.  There is no pulmonary embolus 2. There are no findings of pneumonia and there is no pleural effusion 3. Very small hiatal hernia.      Patient Instructions     Medication List      START taking these medications    Glucagon Emergency 1 MG Kit  Inject 1 mg as directed as needed (hypoglycemia)     glucose monitoring kit  Dispense test strips, lancets and glucometer for 6x daily testing  Dispense quantity sufficient     NovoLOG FlexPen ReliOn 100 UNIT/ML injection pen  Generic drug: insulin aspart  Inject 4 Units into the skin 3 times daily (before meals) Take 4 units TID with meals    For blood sugar sliding scale  150-200 Take 2 unit  200-250 Take 4 Units  250-300 Take 6Units  300-350 Take 8 units  350-450 Take 10 units  For >450 Call your doctor        CHANGE how you take these medications    insulin glargine 100 UNIT/ML injection vial  Commonly known as: LANTUS  Inject 40 Units into the skin nightly  What changed: when to take this     Pen Needles 32G X 5 MM Misc  Dispense pen needles for 6 times per day injections  Dispense quantity sufficienct  What changed:   · how to take this  · additional instructions        CONTINUE taking these medications    amLODIPine 10 MG tablet  Commonly known as: NORVASC     atorvastatin 20 MG tablet  Commonly known as: LIPITOR     benzonatate 100 MG capsule  Commonly known as: TESSALON     clonazePAM 1 MG tablet  Commonly known as: KLONOPIN     cyclobenzaprine 10 MG tablet  Commonly known as: FLEXERIL     fish oil 1000 MG Caps     fluticasone 50 MCG/ACT nasal spray  Commonly known as: FLONASE     gabapentin 300 MG capsule  Commonly known as: NEURONTIN     ibuprofen 800 MG tablet  Commonly known as: ADVIL;MOTRIN     lamoTRIgine 150 MG tablet  Commonly known as: LAMICTAL     Lexapro 10 MG tablet  Generic drug: escitalopram     lisinopril 5 MG tablet  Commonly known as: PRINIVIL;ZESTRIL     lurasidone 40 MG Tabs tablet  Commonly known as: LATUDA     pantoprazole 40 MG tablet  Commonly known as: PROTONIX     zolpidem 10 MG tablet  Commonly known as: AMBIEN        STOP taking these medications    metFORMIN 500 MG tablet  Commonly known as: GLUCOPHAGE           Where to Get Your Medications      Information about where to get these medications is not yet available    Ask your nurse or doctor about these medications  · Glucagon Emergency 1 MG Kit  · glucose monitoring kit  · insulin glargine 100 UNIT/ML injection vial  · NovoLOG FlexPen ReliOn 100 UNIT/ML injection pen  · Pen Needles 32G X 5 MM Misc       Note that more than 30 minutes was spent in preparing discharge papers, discussing discharge with patient, medication review, etc.    Electronically signed by Yolanda Scott DO on 4/3/2022 at 4:18 PM

## 2022-04-03 NOTE — PLAN OF CARE
Problem: Anxiety:  Goal: Level of anxiety will decrease  Description: Level of anxiety will decrease  4/3/2022 1345 by Nicole Farrar RN  Outcome: Met This Shift  4/3/2022 0649 by Tea Cotter RN  Outcome: Met This Shift     Problem: Sensory:  Goal: General experience of comfort will improve  Description: General experience of comfort will improve  4/3/2022 1345 by Nicole Farrar RN  Outcome: Met This Shift  4/3/2022 0649 by Tea Cotter RN  Outcome: Met This Shift     Problem: Health Behavior:  Goal: Ability to identify and utilize available resources and services will improve  Description: Ability to identify and utilize available resources and services will improve  4/3/2022 0649 by Tea Cotter RN  Outcome: Met This Shift     Problem: Metabolic:  Goal: Ability to maintain appropriate glucose levels will improve  Description: Ability to maintain appropriate glucose levels will improve  4/3/2022 1345 by Nicole Farrar RN  Outcome: Met This Shift  4/3/2022 0649 by Tea Cotter RN  Outcome: Met This Shift     Problem: Nutritional:  Goal: Maintenance of adequate nutrition will improve  Description: Maintenance of adequate nutrition will improve  Outcome: Met This Shift     Problem: Tissue Perfusion:  Goal: Adequacy of tissue perfusion will improve  Description: Adequacy of tissue perfusion will improve  4/3/2022 0649 by Tea Cotter RN  Outcome: Met This Shift     Problem: Falls - Risk of:  Goal: Will remain free from falls  Description: Will remain free from falls  Outcome: Met This Shift

## 2022-04-03 NOTE — PROGRESS NOTES
[facility] Progress Note    Admitting Date and Time: 4/2/2022 11:51 AM  Admit Dx: Hypoglycemia [E16.2]  Hypoglycemia, exogenous hyperinsulinemia [E16.1]  Polypharmacy [Z79.899]  Blurry vision, bilateral [H53.8]  Nonintractable headache, unspecified chronicity pattern, unspecified headache type [R51.9]  Type 2 diabetes mellitus without complication, with long-term current use of insulin ([facility]) [E11.9, Z79.4]    Subjective:  Patient is being followed for Hypoglycemia [E16.2]  Hypoglycemia, exogenous hyperinsulinemia [E16.1]  Polypharmacy [Z79.899]  Blurry vision, bilateral [H53.8]  Nonintractable headache, unspecified chronicity pattern, unspecified headache type [R51.9]  Type 2 diabetes mellitus without complication, with long-term current use of insulin ([facility] [address] [E11.9, Z79.4]     Patient is seen laying in bed. She is in no distress. She c/o of some blurry vision- she states this does happen when she has fluctuations in her glucose and this is unchaged from yesterday. Patient states all other issues she reported in ER have resolved. Patient has no other complaints at this time. ROS: denies fever, chills, cp, sob, n/v, HA unless stated above.       amLODIPine  10 mg Oral Daily    atorvastatin  20 mg Oral Daily    clonazePAM  1 mg Oral BID    escitalopram  10 mg Oral Daily    gabapentin  300 mg Oral TID    lamoTRIgine  150 mg Oral Daily    lisinopril  5 mg Oral Daily    pantoprazole  40 mg Oral Daily    zolpidem  10 mg Oral QPM    sodium chloride flush  5-40 mL IntraVENous 2 times per day    enoxaparin  40 mg SubCUTAneous Daily    insulin lispro  0-12 Units SubCUTAneous TID WC    insulin lispro  0-6 Units SubCUTAneous Nightly    lurasidone  40 mg Oral Daily     glucose, 15 g, PRN  dextrose, 12.5 g, PRN  glucagon (rDNA), 1 mg, PRN  dextrose, 100 mL/hr, PRN  benzonatate, 100 mg, TID PRN  sodium chloride flush, 5-40 mL, PRN  sodium chloride, , PRN  ondansetron, 4 mg, Q8H PRN Or  ondansetron, 4 mg, Q6H PRN  polyethylene glycol, 17 g, Daily PRN  acetaminophen, 650 mg, Q6H PRN   Or  acetaminophen, 650 mg, Q6H PRN         Objective:    /63   Pulse 62   Temp 98.1 °F (36.7 °C) (Oral)   Resp 14   Ht 5' 6\" (1.676 m)   Wt 167 lb (75.8 kg)   SpO2 98%   BMI 26.95 kg/m²     General Appearance: alert and oriented to person, place and time and in no acute distress  Skin: warm and dry  Head: normocephalic and atraumatic  Eyes: pupils equal, round, and reactive to light, extraocular eye movements intact, conjunctivae normal, reporting some blurry vision. Neck: neck supple and non tender without mass   Pulmonary/Chest: clear to auscultation bilaterally- no wheezes, rales or rhonchi, normal air movement, no respiratory distress  Cardiovascular: normal rate, normal S1 and S2 and no carotid bruits  Abdomen: soft, non-tender, non-distended, normal bowel sounds, no masses or organomegaly  Extremities: no cyanosis, no clubbing and no edema  Neurologic: no cranial nerve deficit and speech normal        Recent Labs     04/02/22  1307 04/03/22  0000    142   K 3.9 4.3    108*   CO2 26 24   BUN 16 13   CREATININE 0.6 0.7   GLUCOSE 102* 175*   CALCIUM 9.5 8.9       Recent Labs     04/02/22  1307 04/03/22  0000   WBC 5.6 5.1   RBC 4.15 4.12   HGB 13.2 12.6   HCT 38.8 38.5   MCV 93.5 93.4   MCH 31.8 30.6   MCHC 34.0 32.7   RDW 12.4 12.3    197   MPV 11.1 11.0         Assessment:    Principal Problem:    Hypoglycemia, exogenous hyperinsulinemia  Active Problems:    Diabetes mellitus (HCC)    Recurrent major depressive disorder, in partial remission ([address]    Essential (primary) hypertension    Diabetic neuropathy associated with type 2 diabetes mellitus ([address]  Resolved Problems:    * No resolved hospital problems. *      Plan:    1. Hypoglycemia: Likely secondary to recent increase in insulin.   Presented to the ED on 4/2 with complaints of dizziness headache weakness and right eye pain stating symptoms began prior to taking newly increased dose of insulin. Patient follows with NP with Dr Mara Pineda for her medication adjustments. Patient recently on 40 units Lantus and 20 units of 70/30 every morning and 40 units Lantus 15 units 70/30 every afternoon. Hgb A1c was 5.9, continue SSI, Endocrinology input appreciated  2. Essential Hypertension: Continue Norvasc and lisinopril, monitor vitals  3. Major depressive disorder: Continue home medication resume- Latuda, Klonopin, Lexapro, Lamictal  4. Leg pain and headache: States related to fluctuations in blood sugar, both are currently resolved. 5. Diabetic neuropathy: Continue Neurontin 300 mg 3 times daily  6. GERD: Continue Protonix  7. HLD: Takes fish oil at home  8. Insomnia: Continue Ambien      NOTE: This report was transcribed using voice recognition software. Every effort was made to ensure accuracy; however, inadvertent computerized transcription errors may be present.   Electronically signed by FRANCES Hidalgo CNP on 4/3/2022 at 9:03 AM

## 2022-04-05 ENCOUNTER — FOLLOWUP TELEPHONE ENCOUNTER (OUTPATIENT)
Dept: DIABETES SERVICES | Age: 57
End: 2022-04-05

## 2022-04-05 NOTE — PROGRESS NOTES
Called Federica Kirk to touch base with her since she expressed an interest in attending outpatient diabetes education classes before being discharged from the hospital this past weekend. She has an appointment with Dr. Faheem Medel tomorrow, April 6th, but states she is starting back to work tomorrow and does not think she will be able to make it. Encouraged her to call his office to see if the time could be changed. She states she will. Explained that once she sees Dr. Faheem Medel, we can obtain a referral for her to attend diabetes education classes and get her scheduled.  Provided her with our contact information and encouraged her to call with any questions or needs in the meanwhile.  -Mitchell Lawson RN, BSN, Narciso Muniz

## 2022-04-07 LAB — C-PEPTIDE: 0.8 NG/ML (ref 0.5–3.3)

## 2022-04-11 ENCOUNTER — APPOINTMENT (OUTPATIENT)
Dept: GENERAL RADIOLOGY | Age: 57
End: 2022-04-11
Payer: MEDICAID

## 2022-04-11 ENCOUNTER — HOSPITAL ENCOUNTER (EMERGENCY)
Age: 57
Discharge: LEFT BEFORE DISCHARGE | End: 2022-04-11
Attending: EMERGENCY MEDICINE
Payer: MEDICAID

## 2022-04-11 ENCOUNTER — APPOINTMENT (OUTPATIENT)
Dept: CT IMAGING | Age: 57
End: 2022-04-11
Payer: MEDICAID

## 2022-04-11 VITALS
DIASTOLIC BLOOD PRESSURE: 76 MMHG | HEIGHT: 66 IN | RESPIRATION RATE: 20 BRPM | OXYGEN SATURATION: 97 % | SYSTOLIC BLOOD PRESSURE: 159 MMHG | TEMPERATURE: 97.6 F | HEART RATE: 71 BPM | WEIGHT: 167 LBS | BODY MASS INDEX: 26.84 KG/M2

## 2022-04-11 DIAGNOSIS — R06.02 SHORTNESS OF BREATH: ICD-10-CM

## 2022-04-11 DIAGNOSIS — R07.9 CHEST PAIN, UNSPECIFIED TYPE: Primary | ICD-10-CM

## 2022-04-11 DIAGNOSIS — R51.9 ACUTE NONINTRACTABLE HEADACHE, UNSPECIFIED HEADACHE TYPE: ICD-10-CM

## 2022-04-11 DIAGNOSIS — H57.11 PAIN OF RIGHT EYE: ICD-10-CM

## 2022-04-11 LAB
ALBUMIN SERPL-MCNC: 4.8 G/DL (ref 3.5–5.2)
ALP BLD-CCNC: 110 U/L (ref 35–104)
ALT SERPL-CCNC: 23 U/L (ref 0–32)
ANION GAP SERPL CALCULATED.3IONS-SCNC: 9 MMOL/L (ref 7–16)
AST SERPL-CCNC: 17 U/L (ref 0–31)
BASOPHILS ABSOLUTE: 0.02 E9/L (ref 0–0.2)
BASOPHILS RELATIVE PERCENT: 0.4 % (ref 0–2)
BILIRUB SERPL-MCNC: 0.3 MG/DL (ref 0–1.2)
BUN BLDV-MCNC: 9 MG/DL (ref 6–20)
CALCIUM SERPL-MCNC: 9.7 MG/DL (ref 8.6–10.2)
CHLORIDE BLD-SCNC: 102 MMOL/L (ref 98–107)
CO2: 28 MMOL/L (ref 22–29)
CREAT SERPL-MCNC: 0.6 MG/DL (ref 0.5–1)
EKG ATRIAL RATE: 79 BPM
EKG P AXIS: 33 DEGREES
EKG P-R INTERVAL: 120 MS
EKG Q-T INTERVAL: 378 MS
EKG QRS DURATION: 86 MS
EKG QTC CALCULATION (BAZETT): 433 MS
EKG R AXIS: 15 DEGREES
EKG T AXIS: 2 DEGREES
EKG VENTRICULAR RATE: 79 BPM
EOSINOPHILS ABSOLUTE: 0.09 E9/L (ref 0.05–0.5)
EOSINOPHILS RELATIVE PERCENT: 1.9 % (ref 0–6)
GFR AFRICAN AMERICAN: >60
GFR NON-AFRICAN AMERICAN: >60 ML/MIN/1.73
GLUCOSE BLD-MCNC: 119 MG/DL (ref 74–99)
HCT VFR BLD CALC: 43.4 % (ref 34–48)
HEMOGLOBIN: 14.7 G/DL (ref 11.5–15.5)
IMMATURE GRANULOCYTES #: 0.01 E9/L
IMMATURE GRANULOCYTES %: 0.2 % (ref 0–5)
LYMPHOCYTES ABSOLUTE: 1.8 E9/L (ref 1.5–4)
LYMPHOCYTES RELATIVE PERCENT: 37.5 % (ref 20–42)
MCH RBC QN AUTO: 31.1 PG (ref 26–35)
MCHC RBC AUTO-ENTMCNC: 33.9 % (ref 32–34.5)
MCV RBC AUTO: 91.9 FL (ref 80–99.9)
MONOCYTES ABSOLUTE: 0.39 E9/L (ref 0.1–0.95)
MONOCYTES RELATIVE PERCENT: 8.1 % (ref 2–12)
NEUTROPHILS ABSOLUTE: 2.49 E9/L (ref 1.8–7.3)
NEUTROPHILS RELATIVE PERCENT: 51.9 % (ref 43–80)
PDW BLD-RTO: 12.2 FL (ref 11.5–15)
PLATELET # BLD: 199 E9/L (ref 130–450)
PMV BLD AUTO: 11 FL (ref 7–12)
POTASSIUM SERPL-SCNC: 3.9 MMOL/L (ref 3.5–5)
PRO-BNP: 24 PG/ML (ref 0–125)
RBC # BLD: 4.72 E12/L (ref 3.5–5.5)
SARS-COV-2, NAAT: NOT DETECTED
SODIUM BLD-SCNC: 139 MMOL/L (ref 132–146)
TOTAL PROTEIN: 8 G/DL (ref 6.4–8.3)
TROPONIN, HIGH SENSITIVITY: 7 NG/L (ref 0–9)
WBC # BLD: 4.8 E9/L (ref 4.5–11.5)

## 2022-04-11 PROCEDURE — 85025 COMPLETE CBC W/AUTO DIFF WBC: CPT

## 2022-04-11 PROCEDURE — 83880 ASSAY OF NATRIURETIC PEPTIDE: CPT

## 2022-04-11 PROCEDURE — 71046 X-RAY EXAM CHEST 2 VIEWS: CPT

## 2022-04-11 PROCEDURE — 87635 SARS-COV-2 COVID-19 AMP PRB: CPT

## 2022-04-11 PROCEDURE — 6370000000 HC RX 637 (ALT 250 FOR IP): Performed by: PHYSICIAN ASSISTANT

## 2022-04-11 PROCEDURE — 84484 ASSAY OF TROPONIN QUANT: CPT

## 2022-04-11 PROCEDURE — 99283 EMERGENCY DEPT VISIT LOW MDM: CPT

## 2022-04-11 PROCEDURE — 80053 COMPREHEN METABOLIC PANEL: CPT

## 2022-04-11 PROCEDURE — 93005 ELECTROCARDIOGRAM TRACING: CPT | Performed by: PHYSICIAN ASSISTANT

## 2022-04-11 RX ORDER — TETRACAINE HYDROCHLORIDE 5 MG/ML
1 SOLUTION OPHTHALMIC ONCE
Status: COMPLETED | OUTPATIENT
Start: 2022-04-11 | End: 2022-04-11

## 2022-04-11 RX ORDER — DIPHENHYDRAMINE HYDROCHLORIDE 50 MG/ML
50 INJECTION INTRAMUSCULAR; INTRAVENOUS ONCE
Status: DISCONTINUED | OUTPATIENT
Start: 2022-04-11 | End: 2022-04-11 | Stop reason: HOSPADM

## 2022-04-11 RX ORDER — 0.9 % SODIUM CHLORIDE 0.9 %
1000 INTRAVENOUS SOLUTION INTRAVENOUS ONCE
Status: DISCONTINUED | OUTPATIENT
Start: 2022-04-11 | End: 2022-04-11 | Stop reason: HOSPADM

## 2022-04-11 RX ORDER — METOCLOPRAMIDE HYDROCHLORIDE 5 MG/ML
10 INJECTION INTRAMUSCULAR; INTRAVENOUS ONCE
Status: DISCONTINUED | OUTPATIENT
Start: 2022-04-11 | End: 2022-04-11 | Stop reason: HOSPADM

## 2022-04-11 RX ORDER — ERYTHROMYCIN 5 MG/G
OINTMENT OPHTHALMIC
Qty: 3.5 G | Refills: 0 | Status: SHIPPED | OUTPATIENT
Start: 2022-04-11 | End: 2022-04-21

## 2022-04-11 RX ORDER — KETOROLAC TROMETHAMINE 30 MG/ML
30 INJECTION, SOLUTION INTRAMUSCULAR; INTRAVENOUS ONCE
Status: DISCONTINUED | OUTPATIENT
Start: 2022-04-11 | End: 2022-04-11

## 2022-04-11 RX ADMIN — TETRACAINE HYDROCHLORIDE 1 DROP: 5 SOLUTION/ DROPS OPHTHALMIC at 17:43

## 2022-04-11 RX ADMIN — FLUORESCEIN SODIUM 1 EACH: 0.6 STRIP OPHTHALMIC at 17:43

## 2022-04-11 ASSESSMENT — ENCOUNTER SYMPTOMS
SHORTNESS OF BREATH: 1
ABDOMINAL PAIN: 0
EYE PAIN: 1
EYE REDNESS: 0
COUGH: 1
NAUSEA: 0
VOMITING: 0

## 2022-04-11 ASSESSMENT — PAIN DESCRIPTION - FREQUENCY: FREQUENCY: CONTINUOUS

## 2022-04-11 ASSESSMENT — PAIN DESCRIPTION - PAIN TYPE: TYPE: ACUTE PAIN

## 2022-04-11 ASSESSMENT — PAIN SCALES - GENERAL: PAINLEVEL_OUTOF10: 7

## 2022-04-11 ASSESSMENT — PAIN DESCRIPTION - LOCATION: LOCATION: HEAD;EYE

## 2022-04-11 ASSESSMENT — PAIN DESCRIPTION - ORIENTATION: ORIENTATION: RIGHT

## 2022-04-11 ASSESSMENT — PAIN DESCRIPTION - DESCRIPTORS: DESCRIPTORS: THROBBING

## 2022-04-11 NOTE — ED PROVIDER NOTES
Chief complaint: Chest pain pain, cough, shortness of breath and eye pain      HPI:  4/11/22, Time: 6:04 PM EDT    HPI               Sophia Reyes is a 61 y.o. female presenting to the ED for chest pain, cough, shortness of breath and eye pain. The history is obtained from the patient as well as patient's medical record. The patient is presenting to the emergency department with a 3-day history of cough, shortness of breath and chest pain. The patient states that she began approximately 3 days ago with coughing, shortness of breath and chest pain. She does have pain which is described as sharp and located in the substernal region this is worse with coughing. Cough is nonproductive. Symptoms are mild in severity. Nothing makes it better. No treatment prior to arrival.  She does also complain of mild shortness of breath. She does also complain of right eye pain. She states that she did have Lasix many months ago and has had pain in multiple infections in her eye. The pain is located in the right eye. She states that she is having decreased vision. She does complain of a diffuse aching headache. She states she does have a history of migraine. She denies any new fevers, new neck pain or stiffness. No numbness, weakness or paresthesias of the extremities. ROS:   Review of Systems   Constitutional: Negative for chills and fatigue. HENT: Negative for congestion. Eyes: Positive for pain and visual disturbance. Negative for redness. Respiratory: Positive for cough and shortness of breath. Cardiovascular: Positive for chest pain. Gastrointestinal: Negative for abdominal pain, nausea and vomiting. Genitourinary: Negative for dysuria. Musculoskeletal: Negative for arthralgias. Skin: Negative for rash. Neurological: Positive for headaches. Negative for light-headedness. Psychiatric/Behavioral: Negative for confusion.    All other systems reviewed and are negative.      --------------------------------------------- PAST HISTORY ---------------------------------------------  Past Medical History:  has a past medical history of Other hyperlipidemia, Primary hypertension, and Type 2 diabetes mellitus, with long-term current use of insulin ([facility] [address] Past Surgical History:  has no past surgical history on file. Social History:  reports that she has never smoked. She has never used smokeless tobacco. She reports that she does not drink alcohol and does not use drugs. Family History: family history is not on file. The patients home medications have been reviewed. Allergies: Codeine and Penicillins    ---------------------------------------------------PHYSICAL EXAM--------------------------------------      Constitutional/General: Alert and oriented x3, well appearing, non toxic in NAD  Head: Normocephalic and atraumatic  Eyes: PERRL, right conjunctival injection, IOP 17-19, no uptake of fluorescein   Mouth: Oropharynx clear, handling secretions, no trismus  Neck: Supple, full ROM,  Pulmonary: Lungs clear to auscultation bilaterally, no wheezes, rales, or rhonchi. Not in respiratory distress  Cardiovascular:  Regular rate. Regular rhythm. No murmurs  Chest: no chest wall tenderness  Abdomen: Soft. Non tender. Non distended. No rebound, guarding, or rigidity. No pulsatile masses appreciated. Musculoskeletal: Moves all extremities x 4. Warm and well perfused, no clubbing, cyanosis, or edema. Capillary refill <3 seconds  Skin: warm and dry. No rashes. Neurologic: GCS 15, no gross focal neurologic deficits  Psych: Normal Affect    -------------------------------------------------- RESULTS -------------------------------------------------  I have personally reviewed all laboratory and imaging results for this patient. Results are listed below.      LABS:  Results for orders placed or performed during the hospital encounter of 04/11/22   CBC with Auto Differential   Result Value Ref Range    WBC 4.8 4.5 - 11.5 E9/L    RBC 4.72 3.50 - 5.50 E12/L    Hemoglobin 14.7 11.5 - 15.5 g/dL    Hematocrit 43.4 34.0 - 48.0 %    MCV 91.9 80.0 - 99.9 fL    MCH 31.1 26.0 - 35.0 pg    MCHC 33.9 32.0 - 34.5 %    RDW 12.2 11.5 - 15.0 fL    Platelets 566 803 - 513 E9/L    MPV 11.0 7.0 - 12.0 fL    Neutrophils % 51.9 43.0 - 80.0 %    Immature Granulocytes % 0.2 0.0 - 5.0 %    Lymphocytes % 37.5 20.0 - 42.0 %    Monocytes % 8.1 2.0 - 12.0 %    Eosinophils % 1.9 0.0 - 6.0 %    Basophils % 0.4 0.0 - 2.0 %    Neutrophils Absolute 2.49 1.80 - 7.30 E9/L    Immature Granulocytes # 0.01 E9/L    Lymphocytes Absolute 1.80 1.50 - 4.00 E9/L    Monocytes Absolute 0.39 0.10 - 0.95 E9/L    Eosinophils Absolute 0.09 0.05 - 0.50 E9/L    Basophils Absolute 0.02 0.00 - 0.20 E9/L   EKG 12 Lead   Result Value Ref Range    Ventricular Rate 79 BPM    Atrial Rate 79 BPM    P-R Interval 120 ms    QRS Duration 86 ms    Q-T Interval 378 ms    QTc Calculation (Bazett) 433 ms    P Axis 33 degrees    R Axis 15 degrees    T Axis 2 degrees       RADIOLOGY:  Interpreted by Radiologist.  XR CHEST (2 VW)   Final Result   No acute process. EKG:  This EKG is signed and interpreted by me. Normal sinus rhythm rate of 79, no ST segment elevation, there is T wave inversion isolated in lead III, WA interval 120 MS, QRS 86 MS,  MS, stable compared to patient's prior EKG  Comparison: stable as compared to patient's most recent EKG  Interpreted by me      ------------------------- NURSING NOTES AND VITALS REVIEWED ---------------------------   The nursing notes within the ED encounter and vital signs as below have been reviewed by myself. BP (!) 159/76   Pulse 71   Temp 97.6 °F (36.4 °C)   Resp 20   Ht 5' 6\" (1.676 m)   Wt 167 lb (75.8 kg)   SpO2 97%   BMI 26.95 kg/m²   Oxygen Saturation Interpretation: Normal    The patients available past medical records and past encounters were reviewed. ------------------------------ ED COURSE/MEDICAL DECISION MAKING----------------------  Medications   ketorolac (TORADOL) injection 30 mg (has no administration in time range)   tetracaine (TETRAVISC) 0.5 % ophthalmic solution 1 drop (1 drop Right Eye Given 4/11/22 1743)   fluorescein ophthalmic strip 1 each (1 each Ophthalmic Given 4/11/22 1743)             Medical Decision Making:   I, Dr. Marisela Shetty am the primary physician of record. This patient has chosen to leave against medical advice. I have personally explained to them that choosing to do so may result in permanent bodily harm or death. I discussed at length that without further evaluation and monitoring there may be unforeseen circumstances and deterioration resulting in permanent bodily harm or death as a result of their choice. They are alert, oriented, and competent at this time. They state that they are aware of the serious risks as explained, but they continue to wish to leave against medical advice. In light of their decision to leave against medical advice, follow-up has been arranged and they are aware of the importance of following up as instructed. They have been advised that they should return to the ED immediately if they change their mind at any time, or if their condition begins to change or worsen. Re-Evaluations/Consultations:             ED Course as of 04/12/22 1432 Mon Apr 11, 2022 1901 Notified that the patient was signed out [address] [[place]]      ED Course User Index  [MT] Sarah Pinedo DO         ED Course as of 04/12/22 1435 Mon Apr 11, 2022 1901 Notified that the patient was signed out [address]  [[place]]      ED Course User Index  [MT] Sarah Pinedo DO       --------------------------------------------- PAST HISTORY ---------------------------------------------  Past Medical History:  has a past medical history of Other hyperlipidemia, Primary hypertension, and Type 2 diabetes mellitus, with long-term current use of insulin ([facility] [address] Past Surgical History:  has no past surgical history on file. Social History:  reports that she has never smoked. She has never used smokeless tobacco. She reports that she does not drink alcohol and does not use drugs. Family History: family history is not on file. The patients home medications have been reviewed.     Allergies: Codeine and Penicillins    -------------------------------------------------- RESULTS -------------------------------------------------  Labs:  Results for orders placed or performed during the hospital encounter of 04/11/22   COVID-19, Rapid    Specimen: Nasopharyngeal Swab   Result Value Ref Range    SARS-CoV-2, NAAT Not Detected Not Detected   CBC with Auto Differential   Result Value Ref Range    WBC 4.8 4.5 - 11.5 E9/L    RBC 4.72 3.50 - 5.50 E12/L    Hemoglobin 14.7 11.5 - 15.5 g/dL    Hematocrit 43.4 34.0 - 48.0 %    MCV 91.9 80.0 - 99.9 fL    MCH 31.1 26.0 - 35.0 pg    MCHC 33.9 32.0 - 34.5 %    RDW 12.2 11.5 - 15.0 fL    Platelets 368 366 - 690 E9/L    MPV 11.0 7.0 - 12.0 fL    Neutrophils % 51.9 43.0 - 80.0 %    Immature Granulocytes % 0.2 0.0 - 5.0 %    Lymphocytes % 37.5 20.0 - 42.0 %    Monocytes % 8.1 2.0 - 12.0 %    Eosinophils % 1.9 0.0 - 6.0 %    Basophils % 0.4 0.0 - 2.0 %    Neutrophils Absolute 2.49 1.80 - 7.30 E9/L    Immature Granulocytes # 0.01 E9/L    Lymphocytes Absolute 1.80 1.50 - 4.00 E9/L    Monocytes Absolute 0.39 0.10 - 0.95 E9/L    Eosinophils Absolute 0.09 0.05 - 0.50 E9/L    Basophils Absolute 0.02 0.00 - 0.20 E9/L   Comprehensive Metabolic Panel   Result Value Ref Range    Sodium 139 132 - 146 mmol/L    Potassium 3.9 3.5 - 5.0 mmol/L    Chloride 102 98 - 107 mmol/L    CO2 28 22 - 29 mmol/L    Anion Gap 9 7 - 16 mmol/L    Glucose 119 (H) 74 - 99 mg/dL    BUN 9 6 - 20 mg/dL    CREATININE 0.6 0.5 - 1.0 mg/dL    GFR Non-African American >60 >=60 mL/min/1.73    GFR African American >60 Calcium 9.7 8.6 - 10.2 mg/dL    Total Protein 8.0 6.4 - 8.3 g/dL    Albumin 4.8 3.5 - 5.2 g/dL    Total Bilirubin 0.3 0.0 - 1.2 mg/dL    Alkaline Phosphatase 110 (H) 35 - 104 U/L    ALT 23 0 - 32 U/L    AST 17 0 - 31 U/L   Troponin   Result Value Ref Range    Troponin, High Sensitivity 7 0 - 9 ng/L   Brain Natriuretic Peptide   Result Value Ref Range    Pro-BNP 24 0 - 125 pg/mL   EKG 12 Lead   Result Value Ref Range    Ventricular Rate 79 BPM    Atrial Rate 79 BPM    P-R Interval 120 ms    QRS Duration 86 ms    Q-T Interval 378 ms    QTc Calculation (Bazett) 433 ms    P Axis 33 degrees    R Axis 15 degrees    T Axis 2 degrees       Radiology:  XR CHEST (2 VW)    Result Date: 4/11/2022  EXAMINATION: TWO XRAY VIEWS OF THE CHEST 4/11/2022 2:33 pm COMPARISON: 04/02/2022 HISTORY: ORDERING SYSTEM PROVIDED HISTORY: CP TECHNOLOGIST PROVIDED HISTORY: Reason for exam:->CP FINDINGS: The lungs are without acute focal process. There is no effusion or pneumothorax. The cardiomediastinal silhouette is without acute process. The osseous structures are without acute process. No acute process. CT HEAD WO CONTRAST    Result Date: 4/2/2022  EXAMINATION: CT OF THE HEAD WITHOUT CONTRAST  4/2/2022 12:41 pm TECHNIQUE: CT of the head was performed without the administration of intravenous contrast. Dose modulation, iterative reconstruction, and/or weight based adjustment of the mA/kV was utilized to reduce the radiation dose to as low as reasonably achievable. COMPARISON: None. HISTORY: ORDERING SYSTEM PROVIDED HISTORY: headaches TECHNOLOGIST PROVIDED HISTORY: Has a \"code stroke\" or \"stroke alert\" been called? ->No Reason for exam:->headaches Decision Support Exception - unselect if not a suspected or confirmed emergency medical condition->Emergency Medical Condition (MA) FINDINGS: BRAIN/VENTRICLES: There is no acute intracranial hemorrhage, mass effect or midline shift. No abnormal extra-axial fluid collection.   The gray-white differentiation is maintained without evidence of an acute infarct. There is no evidence of hydrocephalus. ORBITS: The visualized portion of the orbits demonstrate no acute abnormality. SINUSES: The visualized paranasal sinuses and mastoid air cells demonstrate no acute abnormality. SOFT TISSUES/SKULL:  No acute abnormality of the visualized skull or soft tissues. No acute intracranial abnormality. XR CHEST PORTABLE    Result Date: 4/2/2022  EXAMINATION: ONE XRAY VIEW OF THE CHEST 4/2/2022 12:57 pm COMPARISON: None. HISTORY: ORDERING SYSTEM PROVIDED HISTORY: chest pain TECHNOLOGIST PROVIDED HISTORY: Reason for exam:->chest pain FINDINGS: Portable chest reveals cardiac silhouette to be borderline enlarged. The lung fields are grossly clear. No pleural effusion or pneumothorax. No focal parenchymal opacification present. No acute cardiopulmonary disease. CTA PULMONARY W CONTRAST    Result Date: 4/2/2022  EXAMINATION: CTA OF THE CHEST 4/2/2022 2:39 pm TECHNIQUE: CTA of the chest was performed after the administration of intravenous contrast.  Multiplanar reformatted images are provided for review. MIP images are provided for review. Dose modulation, iterative reconstruction, and/or weight based adjustment of the mA/kV was utilized to reduce the radiation dose to as low as reasonably achievable. COMPARISON: None. HISTORY: ORDERING SYSTEM PROVIDED HISTORY: chest pain TECHNOLOGIST PROVIDED HISTORY: Reason for exam:->chest pain Decision Support Exception - unselect if not a suspected or confirmed emergency medical condition->Emergency Medical Condition (MA) FINDINGS: Pulmonary Arteries: Pulmonary arteries are adequately opacified for evaluation. No evidence of intraluminal filling defect to suggest pulmonary embolism. Main pulmonary artery is normal in caliber. Mediastinum: No evidence of mediastinal lymphadenopathy. The heart and pericardium demonstrate no acute abnormality.   There is no acute abnormality of the thoracic aorta. Lungs/pleura: The lungs are without acute process. No focal consolidation or pulmonary edema. No evidence of pleural effusion or pneumothorax. Upper Abdomen: Limited images of the upper abdomen are unremarkable. There is a very small hiatal hernia. Soft Tissues/Bones:  Age related degenerative changes of the visualized osseous structures without focal destructive lesion. 1. There is no pulmonary embolus 2. There are no findings of pneumonia and there is no pleural effusion 3. Very small hiatal hernia.       ------------------------- NURSING NOTES AND VITALS REVIEWED ---------------------------  Date / Time Roomed:  4/11/2022  5:00 PM  ED Bed Assignment:  DEBRA/DEBRA    The nursing notes within the ED encounter and vital signs as below have been reviewed. BP (!) 159/76   Pulse 71   Temp 97.6 °F (36.4 °C)   Resp 20   Ht 5' 6\" (1.676 m)   Wt 167 lb (75.8 kg)   SpO2 97%   BMI 26.95 kg/m²   Oxygen Saturation Interpretation: Normal      ------------------------------------------ PROGRESS NOTES ------------------------------------------      I have spoken with the patient and discussed todays availabe results to this point, in addition to providing specific details for the plan of care and counseling regarding the diagnosis and prognosis. Their questions are answered, however they are not agreeable to admission.     --------------------------------- ADDITIONAL PROVIDER NOTES ---------------------------------  This patient has chosen to leave against medical advice. I have personally explained to them that choosing to do so may result in permanent bodily harm or death. I discussed at length that without further evaluation and monitoring there may be unforeseen circumstances and deterioration resulting in permanent bodily harm or death as a result of their choice. They are alert, oriented, and competent at this time.   They state that they are aware of the serious risks as explained, but they continue to wish to leave against medical   advice. In light of their decision to leave against medical advice, follow-up has been arranged and they are aware of the importance of following up as instructed. They have been advised that they should return to the ED immediately if they change their mind at any time, or if their condition begins to change or worsen. The follow up plan has been discussed in detail and they are aware of the specific conditions for emergent return, as well as the importance of follow-up. Discharge Medication List as of 4/11/2022  7:02 PM          Diagnosis:  1. Chest pain, unspecified type    2. Shortness of breath    3. Acute nonintractable headache, unspecified headache type    4. Pain of right eye        Disposition:  Patient's disposition: Left against medical advice. Patient's condition is stable.        Megan Guzman DO  04/12/22 1436

## 2022-04-11 NOTE — ED NOTES
Department of Emergency Medicine  FIRST PROVIDER TRIAGE NOTE             Independent MLP           4/11/22  2:05 PM EDT    Date of Encounter: 4/11/22   MRN: [id number]      HPI: Dontrell Ventura is a 61 y.o. female who presents to the ED for Eye Problem (right ), Chest Pain, and Shortness of Breath     Patient is a 58-year-old female that is presenting with a right eye problem, chest pain and shortness of breath. Patient had LASEK surgery in November and states that the eye clinic \"will not help me. \"  Patient states she had intermittent infections since then and nothing has been able to help. Patient has not been placed on any antibiotics, patient has been on nothing since the eye surgery    ROS: Negative for vomiting, diarrhea, urinary complaints or rash. PE: Gen Appearance/Constitutional: alert  HEENT: NC/NT. PERRLA,  Airway patent. Neck: supple     Initial Plan of Care: All treatment areas with department are currently occupied. Plan to order/Initiate the following while awaiting opening in ED: labs, EKG and imaging studies.   Initiate Treatment-Testing, Proceed toTreatment Area When Bed Available for ED Attending/MLP to Continue Care    Electronically signed by Chaitanya Carver PA-C   DD: 4/11/22         Chaitanya Carver PA-C  04/11/22 [id number]

## 2022-04-11 NOTE — Clinical Note
The patient has decided to leave against medical advice, because she does not want to stay  . She has normal mental status and adequate capacity to make medical decisions. The patient refuses hospital admission and wants to be discharged. Th e risks have been explained to the patient, including worsening illness, chronic pain, permanent disability, and death. The benefits of admission have also been explained, including the availability and proximity of nurses, physicians, monitoring,  diagnostic testing, and treatment. The patient was able to understand and state the risks and benefits of hospital admission. This was witnessed by nurse and me. She had the opportunity to ask questions about her medical condition. The pat ient was treated to the extent that she would allow, and knows that she may return for care at any time. Follow up will be with PCP.

## 2022-05-04 ENCOUNTER — HOSPITAL ENCOUNTER (EMERGENCY)
Age: 57
Discharge: HOME | End: 2022-05-04
Payer: MEDICAID

## 2022-05-04 VITALS
WEIGHT: 167 LBS | TEMPERATURE: 99 F | RESPIRATION RATE: 20 BRPM | DIASTOLIC BLOOD PRESSURE: 78 MMHG | OXYGEN SATURATION: 96 % | SYSTOLIC BLOOD PRESSURE: 139 MMHG | BODY MASS INDEX: 26.95 KG/M2 | HEART RATE: 79 BPM

## 2022-05-04 DIAGNOSIS — B85.0 LICE INFESTED HAIR: Primary | ICD-10-CM

## 2022-05-04 PROCEDURE — 99283 EMERGENCY DEPT VISIT LOW MDM: CPT

## 2022-05-04 ASSESSMENT — PAIN DESCRIPTION - LOCATION: LOCATION: HEAD

## 2022-05-04 ASSESSMENT — PAIN - FUNCTIONAL ASSESSMENT: PAIN_FUNCTIONAL_ASSESSMENT: 0-10

## 2022-05-04 NOTE — ED PROVIDER NOTES
[facility]  Department of Emergency Medicine   ED  Encounter Note  Admit Date/RoomTime: 2022  3:50 PM  ED Room: [place] [address]  NAME: Shalom Solares  : 1965  MRN: [id number]     Chief Complaint:  Head Lice ([facility]IN[facility] [address] [facility])    HISTORY OF PRESENT ILLNESS        Shalom Solares is a 61 y.o. female who presents to the ED by private vehicle for exposure to head lice. She has not seen any bugs on her actual hair. She does not take anything at home prior to for her symptoms. She denies any fevers chills wounds or any other symptoms. ROS   Pertinent positives and negatives are stated within HPI, all other systems reviewed and are negative. Past Medical History:  has a past medical history of Other hyperlipidemia, Primary hypertension, and Type 2 diabetes mellitus, with long-term current use of insulin ([facility] [address] Surgical History:  has no past surgical history on file. Social History:  reports that she has never smoked. She has never used smokeless tobacco. She reports that she does not drink alcohol and does not use drugs. Family History: family history is not on file. Allergies: Codeine and Penicillins    PHYSICAL EXAM   Oxygen Saturation Interpretation: Normal on room air analysis. ED Triage Vitals   BP Temp Temp Source Pulse Resp SpO2 Height Weight   22 1549 22 1532 22 1532 22 1532 22 1549 22 1532 -- 22 1549   139/78 99 °F (37.2 °C) Oral 79 20 96 %  167 lb (75.8 kg)         Physical Exam  Constitutional/General: Alert and oriented x3, well appearing, non toxic  HEENT:  NC/NT. PERRLA,  Airway patent. Dandruff versus nits in hair no active life lice noted. Neck: Supple, full ROM, non tender to palpation in the midline, no stridor, no crepitus, no meningeal signs  Respiratory: Lungs clear to auscultation bilaterally, no wheezes, rales, or rhonchi.  Not in respiratory distress  CV:  Regular rate. Regular rhythm. No murmurs, gallops, or rubs. 2+ distal pulses  Chest: No chest wall tenderness  GI:  Abdomen Soft, Non tender, Non distended. +BS. No rebound, guarding, or rigidity. No pulsatile masses. Musculoskeletal: Moves all extremities x 4. Warm and well perfused, no clubbing, cyanosis, or edema. Capillary refill <3 seconds  Integument: skin warm and dry. No rashes. Lymphatic: no lymphadenopathy noted  Neurologic: GCS 15, no focal deficits, symmetric strength 5/5 in the upper and lower extremities bilaterally  Psychiatric: Normal Affect    Lab / Imaging Results   (All laboratory and radiology results have been personally reviewed by myself)  Labs:  No results found for this visit on 05/04/22. Imaging: All Radiology results interpreted by Radiologist unless otherwise noted. No orders to display       ED Course / Medical Decision Making   Medications - No data to display       Consult(s):   None        MDM: We will treat for lice exposure patient advised on home precautions as well as outpatient follow-up. Plan of Care/Counseling:  FRANCES Schulte CNP reviewed today's visit with the patient in addition to providing specific details for the plan of care and counseling regarding the diagnosis and prognosis. Questions are answered at this time and are agreeable with the plan. ASSESSMENT     1. Lice infested hair      PLAN   Discharged home. Patient condition is good    New Medications     Discharge Medication List as of 5/4/2022  4:01 PM      START taking these medications    Details   pyrethrins-piperonyl butoxide (LICIDE) 1.11-5 % shampoo Apply topically once as directed, Disp-1 each, R-1, Print           Electronically signed by FRANCES Scuhlte CNP   DD: 5/4/22  **This report was transcribed using voice recognition software. Every effort was made to ensure accuracy; however, inadvertent computerized transcription errors may be present.   END OF ED PROVIDER NOTE Governor Re, FRANCES - CNP  05/04/22 [id number]

## 2022-05-12 ENCOUNTER — OFFICE VISIT (OUTPATIENT)
Dept: PRIMARY CARE CLINIC | Age: 57
End: 2022-05-12
Payer: MEDICAID

## 2022-05-12 VITALS
OXYGEN SATURATION: 97 % | SYSTOLIC BLOOD PRESSURE: 128 MMHG | WEIGHT: 161 LBS | DIASTOLIC BLOOD PRESSURE: 60 MMHG | TEMPERATURE: 97.5 F | HEART RATE: 65 BPM | HEIGHT: 66 IN | RESPIRATION RATE: 18 BRPM | BODY MASS INDEX: 25.88 KG/M2

## 2022-05-12 DIAGNOSIS — F31.9 BIPOLAR 1 DISORDER (HCC): ICD-10-CM

## 2022-05-12 DIAGNOSIS — F41.9 ANXIETY: Primary | ICD-10-CM

## 2022-05-12 DIAGNOSIS — R42 VERTIGO: ICD-10-CM

## 2022-05-12 DIAGNOSIS — I10 HYPERTENSION, UNSPECIFIED TYPE: ICD-10-CM

## 2022-05-12 DIAGNOSIS — G62.9 NEUROPATHY: ICD-10-CM

## 2022-05-12 DIAGNOSIS — Z79.4 TYPE 2 DIABETES MELLITUS WITH HYPOGLYCEMIA WITHOUT COMA, WITH LONG-TERM CURRENT USE OF INSULIN (HCC): ICD-10-CM

## 2022-05-12 DIAGNOSIS — E11.649 TYPE 2 DIABETES MELLITUS WITH HYPOGLYCEMIA WITHOUT COMA, WITH LONG-TERM CURRENT USE OF INSULIN (HCC): ICD-10-CM

## 2022-05-12 PROCEDURE — 3044F HG A1C LEVEL LT 7.0%: CPT | Performed by: STUDENT IN AN ORGANIZED HEALTH CARE EDUCATION/TRAINING PROGRAM

## 2022-05-12 PROCEDURE — 99204 OFFICE O/P NEW MOD 45 MIN: CPT | Performed by: STUDENT IN AN ORGANIZED HEALTH CARE EDUCATION/TRAINING PROGRAM

## 2022-05-12 RX ORDER — AMLODIPINE BESYLATE 10 MG/1
10 TABLET ORAL DAILY
Qty: 30 TABLET | Refills: 0 | Status: SHIPPED | OUTPATIENT
Start: 2022-05-12

## 2022-05-12 RX ORDER — INSULIN ASPART 100 [IU]/ML
INJECTION, SOLUTION INTRAVENOUS; SUBCUTANEOUS
Qty: 5 PEN | Refills: 3 | Status: SHIPPED | OUTPATIENT
Start: 2022-05-12

## 2022-05-12 RX ORDER — INSULIN GLARGINE 100 [IU]/ML
40 INJECTION, SOLUTION SUBCUTANEOUS NIGHTLY
Qty: 5 PEN | Refills: 0 | Status: SHIPPED | OUTPATIENT
Start: 2022-05-12 | End: 2022-06-11

## 2022-05-12 RX ORDER — GABAPENTIN 300 MG/1
300 CAPSULE ORAL 3 TIMES DAILY
Qty: 90 CAPSULE | Refills: 0 | Status: SHIPPED | OUTPATIENT
Start: 2022-05-12 | End: 2022-06-11

## 2022-05-12 RX ORDER — CLONAZEPAM 1 MG/1
1 TABLET ORAL 2 TIMES DAILY
Qty: 60 TABLET | Refills: 0 | Status: SHIPPED | OUTPATIENT
Start: 2022-05-12 | End: 2022-06-11

## 2022-05-12 RX ORDER — ESCITALOPRAM OXALATE 10 MG/1
10 TABLET ORAL DAILY
Qty: 30 TABLET | Refills: 0 | Status: SHIPPED | OUTPATIENT
Start: 2022-05-12

## 2022-05-12 RX ORDER — LAMOTRIGINE 150 MG/1
150 TABLET ORAL DAILY
Qty: 30 TABLET | Refills: 0 | Status: SHIPPED | OUTPATIENT
Start: 2022-05-12

## 2022-05-12 RX ORDER — INSULIN ASPART 100 [IU]/ML
4 INJECTION, SOLUTION INTRAVENOUS; SUBCUTANEOUS
Qty: 5 PEN | Refills: 3 | Status: SHIPPED | OUTPATIENT
Start: 2022-05-12 | End: 2022-05-12

## 2022-05-12 RX ORDER — MECLIZINE HCL 12.5 MG/1
12.5 TABLET ORAL 3 TIMES DAILY PRN
Qty: 30 TABLET | Refills: 0 | Status: SHIPPED | OUTPATIENT
Start: 2022-05-12 | End: 2022-05-22

## 2022-05-12 SDOH — ECONOMIC STABILITY: FOOD INSECURITY: WITHIN THE PAST 12 MONTHS, THE FOOD YOU BOUGHT JUST DIDN'T LAST AND YOU DIDN'T HAVE MONEY TO GET MORE.: NEVER TRUE

## 2022-05-12 SDOH — ECONOMIC STABILITY: FOOD INSECURITY: WITHIN THE PAST 12 MONTHS, YOU WORRIED THAT YOUR FOOD WOULD RUN OUT BEFORE YOU GOT MONEY TO BUY MORE.: NEVER TRUE

## 2022-05-12 ASSESSMENT — PATIENT HEALTH QUESTIONNAIRE - PHQ9
SUM OF ALL RESPONSES TO PHQ QUESTIONS 1-9: 13
9. THOUGHTS THAT YOU WOULD BE BETTER OFF DEAD, OR OF HURTING YOURSELF: 0
SUM OF ALL RESPONSES TO PHQ QUESTIONS 1-9: 13
4. FEELING TIRED OR HAVING LITTLE ENERGY: 3
SUM OF ALL RESPONSES TO PHQ QUESTIONS 1-9: 13
5. POOR APPETITE OR OVEREATING: 1
7. TROUBLE CONCENTRATING ON THINGS, SUCH AS READING THE NEWSPAPER OR WATCHING TELEVISION: 1
10. IF YOU CHECKED OFF ANY PROBLEMS, HOW DIFFICULT HAVE THESE PROBLEMS MADE IT FOR YOU TO DO YOUR WORK, TAKE CARE OF THINGS AT HOME, OR GET ALONG WITH OTHER PEOPLE: 0
SUM OF ALL RESPONSES TO PHQ9 QUESTIONS 1 & 2: 4
3. TROUBLE FALLING OR STAYING ASLEEP: 3
8. MOVING OR SPEAKING SO SLOWLY THAT OTHER PEOPLE COULD HAVE NOTICED. OR THE OPPOSITE, BEING SO FIGETY OR RESTLESS THAT YOU HAVE BEEN MOVING AROUND A LOT MORE THAN USUAL: 0
6. FEELING BAD ABOUT YOURSELF - OR THAT YOU ARE A FAILURE OR HAVE LET YOURSELF OR YOUR FAMILY DOWN: 1
1. LITTLE INTEREST OR PLEASURE IN DOING THINGS: 1
SUM OF ALL RESPONSES TO PHQ QUESTIONS 1-9: 13
2. FEELING DOWN, DEPRESSED OR HOPELESS: 3

## 2022-05-12 ASSESSMENT — SOCIAL DETERMINANTS OF HEALTH (SDOH): HOW HARD IS IT FOR YOU TO PAY FOR THE VERY BASICS LIKE FOOD, HOUSING, MEDICAL CARE, AND HEATING?: NOT HARD AT ALL

## 2022-05-12 NOTE — PROGRESS NOTES
[facility]  Department of Family Medicine      Patient:  Sabrina Wolf 61 y.o. female     Date of Service: 5/12/22      Chief complaint:   Chief Complaint   Patient presents with    Establish Care         History ofPresent Illness   The patient is a 61 y.o. female  presented to the clinic with complaints as above. NP    Hx of mood, used to follow with pyshciatrist for this, on latuda, lexapro, lamictal for this  -states she takes klonopin and [place] for sleep    Diabetes, type 2   -chronic issue  -on novolog and lantus, takes 40 lantus nightly, 6 units novolog TID, was on metformin, however ran out of this so no longer taking   -is checking her sugars, typically     HTN  -chronic issue  -taking lisinopril and amlodipine  -does have instances of dizziness, where the room is spinning, happens maybe 3 times a week, states meclizine helps a lot with this    States she has instances of panic attacks where she will feel anxious, then get short of breath and her heart will race, this typically lasts for 20-25 minutes  -denies any hx of heart attacks in the past     Past Medical History:      Diagnosis Date    Anxiety     Other hyperlipidemia     Primary hypertension     Type 2 diabetes mellitus, with long-term current use of insulin ([facility] [address]        PastSurgical History:    No past surgical history on file.     Allergies:    Codeine and Penicillins    Social History:   Social History     Socioeconomic History    Marital status:      Spouse name: Not on file    Number of children: Not on file    Years of education: Not on file    Highest education level: Not on file   Occupational History    Not on file   Tobacco Use    Smoking status: Never Smoker    Smokeless tobacco: Never Used   Substance and Sexual Activity    Alcohol use: Never    Drug use: Never    Sexual activity: Not on file   Other Topics Concern    Not on file   Social History Narrative    Not on file Social Determinants of Health     Financial Resource Strain: Low Risk     Difficulty of Paying Living Expenses: Not hard at all   Food Insecurity: No Food Insecurity    Worried About Running Out of Food in the Last Year: Never true    Roe of Food in the Last Year: Never true   Transportation Needs:     Lack of Transportation (Medical): Not on file    Lack of Transportation (Non-Medical): Not on file   Physical Activity:     Days of Exercise per Week: Not on file    Minutes of Exercise per Session: Not on file   Stress:     Feeling of Stress : Not on file   Social Connections:     Frequency of Communication with Friends and Family: Not on file    Frequency of Social Gatherings with Friends and Family: Not on file    Attends [employer] Services: Not on file    Active Member of [address] [place] [address] [email or web address] or Organizations: Not on file    Attends Club or Organization Meetings: Not on file    Marital Status: Not on file   Intimate Partner Violence:     Fear of Current or Ex-Partner: Not on file    Emotionally Abused: Not on file    Physically Abused: Not on file    Sexually Abused: Not on file   Housing Stability:     Unable to Pay for Housing in the Last Year: Not on file    Number of [place] in the Last Year: Not on file    Unstable Housing in the Last Year: Not on file        Family History:   No family history on file. Review of Systems:   Review of Systems - as above     Physical Exam   Vitals: /60   Pulse 65   Temp 97.5 °F (36.4 °C) (Infrared)   Resp 18   Ht 5' 6\" (1.676 m)   Wt 161 lb (73 kg)   SpO2 97%   BMI 25.99 kg/m²   Physical Exam  Constitutional:       Appearance: She is well-developed. HENT:      Head: Normocephalic. Cardiovascular:      Rate and Rhythm: Normal rate and regular rhythm. Heart sounds: Normal heart sounds. No murmur heard. Pulmonary:      Effort: Pulmonary effort is normal. No respiratory distress. Breath sounds: Normal breath sounds. No wheezing. Abdominal:      General: Bowel sounds are normal.      Palpations: Abdomen is soft. Musculoskeletal:         General: No tenderness. Normal range of motion. Skin:     General: Skin is warm and dry. Neurological:      Mental Status: She is alert and oriented to person, place, and time. Psychiatric:         Mood and Affect: Mood is anxious. Speech: Speech is rapid and pressured. Behavior: Behavior is agitated. Assessment and Plan       1. Anxiety  Chronic issue  -States she feels better controlled on klonopin for this, advised I will only do 1 month supply to give her enough time to get in to see a psychiatrist  -Close f/u in 1 month, if has not established with psychiatrist, will decrease dosage to 0.5 BID   -Advised that I would only refill one benzo at this time (is also on ambien for sleep), patient did get agitated at this, and I explained to her that I do not feel comfortable giving her two benzo's at this time as the risks outweigh the benefits   - clonazePAM (KLONOPIN) 1 MG tablet; Take 1 tablet by mouth in the morning and at bedtime for 30 days. Dispense: 60 tablet; Refill: 0    2. Bipolar 1 disorder ([facility] [address]  Chronic issue  -Stable, needs referred to psychiatrist, will give 1 month prescription of bipolar medications as below in order to give her time to see psych  - lamoTRIgine (LAMICTAL) 150 MG tablet; Take 1 tablet by mouth daily  Dispense: 30 tablet; Refill: 0  - escitalopram (LEXAPRO) 10 MG tablet; Take 1 tablet by mouth daily  Dispense: 30 tablet; Refill: 0  - lurasidone (LATUDA) 40 MG TABS tablet; Take 1 tablet by mouth daily  Dispense: 30 tablet; Refill: 0  - External Referral To Psychiatry    3.  Type 2 diabetes mellitus with hypoglycemia without coma, with long-term current use of insulin (HCC)  Chronic issue  -Well controlled given last a1c, continue same, will consider decreasing insulin and starting oral regimen, re started metformin  - insulin glargine (LANTUS) 100 UNIT/ML injection vial; Inject 40 Units into the skin nightly  Dispense: 5 pen; Refill: 0  - insulin aspart (NOVOLOG FLEXPEN RELION) 100 UNIT/ML injection pen; Take 4 units TID with meals and 2 units after meals  Dispense: 5 pen; Refill: 3    4. Vertigo  Chronic issue  -Well controlled on antivert, will re order this as below  - meclizine (ANTIVERT) 12.5 MG tablet; Take 1 tablet by mouth 3 times daily as needed for Dizziness  Dispense: 30 tablet; Refill: 0    5. Hypertension, unspecified type  Chronic issue  -Well controlled, continue same   - amLODIPine (NORVASC) 10 MG tablet; Take 1 tablet by mouth daily  Dispense: 30 tablet; Refill: 0    6. Neuropathy  Med refill   - gabapentin (NEURONTIN) 300 MG capsule; Take 1 capsule by mouth 3 times daily for 30 days. Dispense: 90 capsule; Refill: 0      Counseled regarding above diagnosis, including possible risks and complications,  especially if left uncontrolled. Counseled regarding the possible side effects, risks, benefits and alternatives to treatment;patient and/or guardian verbalizes understanding, agrees, feels comfortable with and wishes to proceed with above treatment plan. Call or go to [address] [place] if symptoms worsen or persist. Advised patient to call with any new medication issues, and, as applicable, read all Rx info from pharmacy to assure aware of all possible risks and side effects of medicationbefore taking. Patient and/or guardian given opportunity to ask questions/raise concerns. The patient verbalized comfort and understanding ofinstructions. I encourage further reading and education about your health conditions. Information on many health conditions is provided by [facility] [employer]: [email or web address]  Please bring any questions to me at your nextvisit. Return to Office: Return in about 1 month (around 6/12/2022) for f/u mood and diabetes .     Medication List:    Current Outpatient Medications   Medication Sig Dispense Refill    meclizine (ANTIVERT) 12.5 MG tablet Take 1 tablet by mouth 3 times daily as needed for Dizziness 30 tablet 0    clonazePAM (KLONOPIN) 1 MG tablet Take 1 tablet by mouth in the morning and at bedtime for 30 days. 60 tablet 0    lamoTRIgine (LAMICTAL) 150 MG tablet Take 1 tablet by mouth daily 30 tablet 0    escitalopram (LEXAPRO) 10 MG tablet Take 1 tablet by mouth daily 30 tablet 0    insulin glargine (LANTUS) 100 UNIT/ML injection vial Inject 40 Units into the skin nightly 5 pen 0    gabapentin (NEURONTIN) 300 MG capsule Take 1 capsule by mouth 3 times daily for 30 days. 90 capsule 0    lurasidone (LATUDA) 40 MG TABS tablet Take 1 tablet by mouth daily 30 tablet 0    amLODIPine (NORVASC) 10 MG tablet Take 1 tablet by mouth daily 30 tablet 0    insulin aspart (NOVOLOG FLEXPEN RELION) 100 UNIT/ML injection pen Take 4 units TID with meals and 2 units after meals 5 pen 3    metFORMIN (GLUCOPHAGE) 1000 MG tablet Take 1 tablet by mouth 2 times daily (with meals) 180 tablet 1    atorvastatin (LIPITOR) 20 MG tablet Take 20 mg by mouth daily      zolpidem (AMBIEN) 10 MG tablet Take 10 mg by mouth every evening.        pantoprazole (PROTONIX) 40 MG tablet Take 40 mg by mouth daily      cyclobenzaprine (FLEXERIL) 10 MG tablet Take 10 mg by mouth nightly as needed for Muscle spasms      Omega-3 Fatty Acids (FISH OIL) 1000 MG CAPS Take 1,000 mg by mouth 2 times daily      ibuprofen (ADVIL;MOTRIN) 800 MG tablet Take 800 mg by mouth every 8 hours as needed for Pain (mild pain)      lisinopril (PRINIVIL;ZESTRIL) 5 MG tablet Take 5 mg by mouth daily      Insulin Pen Needle (PEN NEEDLES) 32G X 5 MM MISC Dispense pen needles for 6 times per day injections  Dispense quantity sufficienct 120 each 0    benzonatate (TESSALON) 100 MG capsule Take 100 mg by mouth 3 times daily as needed for Cough      fluticasone (FLONASE) 50 MCG/ACT nasal spray 1 spray by Each Nostril route daily      Glucagon, rDNA, (GLUCAGON EMERGENCY) 1 MG KIT Inject 1 mg as directed as needed (hypoglycemia) 1 kit 2    glucose monitoring (FREESTYLE FREEDOM) kit Dispense test strips, lancets and glucometer for 6x daily testing  Dispense quantity sufficient 1 kit 0     No current facility-administered medications for this visit. Zeeshan Garcia,        This document may have been prepared at least partially through the use of voice recognition software. Although effort is taken to assure the accuracy ofthis document, it is possible that grammatical, syntax,  or spelling errors may occur.